# Patient Record
Sex: FEMALE | Race: WHITE | Employment: UNEMPLOYED | URBAN - METROPOLITAN AREA
[De-identification: names, ages, dates, MRNs, and addresses within clinical notes are randomized per-mention and may not be internally consistent; named-entity substitution may affect disease eponyms.]

---

## 2017-01-27 ENCOUNTER — TELEPHONE (OUTPATIENT)
Dept: INTERNAL MEDICINE CLINIC | Age: 57
End: 2017-01-27

## 2017-01-27 RX ORDER — AZITHROMYCIN 250 MG/1
TABLET, FILM COATED ORAL
Qty: 1 PACKET | Refills: 0 | Status: SHIPPED | OUTPATIENT
Start: 2017-01-27 | End: 2017-10-12 | Stop reason: SDUPTHER

## 2017-03-17 ENCOUNTER — TELEPHONE (OUTPATIENT)
Dept: INTERNAL MEDICINE CLINIC | Age: 57
End: 2017-03-17

## 2017-03-17 RX ORDER — AMOXICILLIN 500 MG/1
500 CAPSULE ORAL 3 TIMES DAILY
Qty: 30 CAPSULE | Refills: 0 | Status: SHIPPED | OUTPATIENT
Start: 2017-03-17 | End: 2017-05-19 | Stop reason: SDUPTHER

## 2017-05-19 ENCOUNTER — TELEPHONE (OUTPATIENT)
Dept: INTERNAL MEDICINE CLINIC | Age: 57
End: 2017-05-19

## 2017-05-19 RX ORDER — AMOXICILLIN 500 MG/1
500 CAPSULE ORAL 3 TIMES DAILY
Qty: 30 CAPSULE | Refills: 0 | Status: SHIPPED | OUTPATIENT
Start: 2017-05-19 | End: 2017-05-29

## 2017-06-06 DIAGNOSIS — I10 ESSENTIAL HYPERTENSION: ICD-10-CM

## 2017-06-06 RX ORDER — LOSARTAN POTASSIUM AND HYDROCHLOROTHIAZIDE 12.5; 1 MG/1; MG/1
TABLET ORAL
Qty: 30 TABLET | Refills: 4 | Status: SHIPPED | OUTPATIENT
Start: 2017-06-06 | End: 2017-10-17 | Stop reason: SDUPTHER

## 2017-10-12 ENCOUNTER — TELEPHONE (OUTPATIENT)
Dept: INTERNAL MEDICINE CLINIC | Age: 57
End: 2017-10-12

## 2017-10-12 RX ORDER — AZITHROMYCIN 250 MG/1
TABLET, FILM COATED ORAL
Qty: 1 PACKET | Refills: 0 | Status: SHIPPED | OUTPATIENT
Start: 2017-10-12 | End: 2017-10-17 | Stop reason: ALTCHOICE

## 2017-10-12 NOTE — TELEPHONE ENCOUNTER
Unable to see today.   Called in Marita Rodriguez to [facility] in [place]    can see tomorrow

## 2017-10-17 ENCOUNTER — OFFICE VISIT (OUTPATIENT)
Dept: INTERNAL MEDICINE CLINIC | Age: 57
End: 2017-10-17

## 2017-10-17 VITALS
HEIGHT: 66 IN | WEIGHT: 248.2 LBS | SYSTOLIC BLOOD PRESSURE: 138 MMHG | BODY MASS INDEX: 39.89 KG/M2 | HEART RATE: 76 BPM | DIASTOLIC BLOOD PRESSURE: 84 MMHG

## 2017-10-17 DIAGNOSIS — J02.9 PHARYNGITIS, UNSPECIFIED ETIOLOGY: Primary | ICD-10-CM

## 2017-10-17 DIAGNOSIS — I10 ESSENTIAL HYPERTENSION: ICD-10-CM

## 2017-10-17 PROCEDURE — 99213 OFFICE O/P EST LOW 20 MIN: CPT | Performed by: INTERNAL MEDICINE

## 2017-10-17 RX ORDER — FLUTICASONE PROPIONATE 50 MCG
1 SPRAY, SUSPENSION (ML) NASAL DAILY
Qty: 1 BOTTLE | Refills: 3 | Status: SHIPPED | OUTPATIENT
Start: 2017-10-17 | End: 2020-12-21

## 2017-10-17 RX ORDER — AMOXICILLIN 250 MG/1
250 CAPSULE ORAL 3 TIMES DAILY
Qty: 30 CAPSULE | Refills: 0 | Status: SHIPPED | OUTPATIENT
Start: 2017-10-17 | End: 2017-10-27

## 2017-10-17 RX ORDER — AMOXICILLIN 500 MG/1
500 CAPSULE ORAL 3 TIMES DAILY
Qty: 30 CAPSULE | Refills: 0 | Status: SHIPPED | OUTPATIENT
Start: 2017-10-17 | End: 2017-10-27

## 2017-10-17 RX ORDER — LOSARTAN POTASSIUM AND HYDROCHLOROTHIAZIDE 12.5; 1 MG/1; MG/1
TABLET ORAL
Qty: 30 TABLET | Refills: 5 | Status: SHIPPED | OUTPATIENT
Start: 2017-10-17

## 2017-10-29 ASSESSMENT — ENCOUNTER SYMPTOMS
RESPIRATORY NEGATIVE: 1
ALLERGIC/IMMUNOLOGIC NEGATIVE: 1
GASTROINTESTINAL NEGATIVE: 1
EYES NEGATIVE: 1

## 2017-10-29 NOTE — PROGRESS NOTES
alert and oriented to person, place, and time. Skin: Skin is warm and dry. Psychiatric: She has a normal mood and affect. Assessment:            Plan:      Rancho Bowser [id number] was seen today for other. Diagnoses and all orders for this visit:    Pharyngitis, unspecified etiology  -     amoxicillin (AMOXIL) 250 MG capsule; Take 1 capsule by mouth 3 times daily for 10 days  -     fluticasone (FLONASE) 50 MCG/ACT nasal spray; 1 spray by Nasal route daily  -     amoxicillin (AMOXIL) 500 MG capsule;  Take 1 capsule by mouth 3 times daily for 10 days    Essential hypertension  -     losartan-hydrochlorothiazide (HYZAAR) 100-12.5 MG per tablet; TAKE ONE TABLET BY MOUTH DAILY

## 2017-11-10 ENCOUNTER — OFFICE VISIT (OUTPATIENT)
Dept: INTERNAL MEDICINE CLINIC | Age: 57
End: 2017-11-10

## 2017-11-10 VITALS
HEART RATE: 68 BPM | BODY MASS INDEX: 40.84 KG/M2 | DIASTOLIC BLOOD PRESSURE: 78 MMHG | TEMPERATURE: 97.5 F | SYSTOLIC BLOOD PRESSURE: 146 MMHG | RESPIRATION RATE: 14 BRPM | WEIGHT: 249.2 LBS

## 2017-11-10 DIAGNOSIS — J02.9 PHARYNGITIS, UNSPECIFIED ETIOLOGY: Primary | ICD-10-CM

## 2017-11-10 PROCEDURE — 99213 OFFICE O/P EST LOW 20 MIN: CPT | Performed by: INTERNAL MEDICINE

## 2017-11-10 RX ORDER — AZITHROMYCIN 250 MG/1
TABLET, FILM COATED ORAL
Qty: 1 PACKET | Refills: 1 | Status: SHIPPED | OUTPATIENT
Start: 2017-11-10 | End: 2017-11-20

## 2017-11-19 ASSESSMENT — ENCOUNTER SYMPTOMS
EYES NEGATIVE: 1
ALLERGIC/IMMUNOLOGIC NEGATIVE: 1
GASTROINTESTINAL NEGATIVE: 1
RESPIRATORY NEGATIVE: 1

## 2017-11-20 NOTE — PROGRESS NOTES
Subjective:      Patient ID: Christina Camacho is a 56 y.o. female. HPI      This patient is here for an acute visit secondary to severe pharyngitis. Patient with multiple pits and a tonsil area however the patient has had prior tonsillectomy. Patient has severe sore throat. Review of Systems   Constitutional: Negative. HENT: Negative. Eyes: Negative. Respiratory: Negative. Cardiovascular: Negative. Gastrointestinal: Negative. Endocrine: Negative. Genitourinary: Negative. Musculoskeletal: Negative. Skin: Negative. Allergic/Immunologic: Negative. Neurological: Negative. Hematological: Negative. Psychiatric/Behavioral: Negative. Objective:   Physical Exam   Constitutional: She is oriented to person, place, and time. She appears well-developed and well-nourished. Neck: Normal range of motion. Neck supple. Cardiovascular: Normal rate, regular rhythm, normal heart sounds and intact distal pulses. No murmur heard. Pulmonary/Chest: Effort normal and breath sounds normal.   Abdominal: Soft. Bowel sounds are normal.   Musculoskeletal: Normal range of motion. Neurological: She is alert and oriented to person, place, and time. Skin: Skin is warm and dry. Psychiatric: She has a normal mood and affect. Assessment:            Plan:      Jayda Naqvi was seen today for pharyngitis, chills and ear fullness. Diagnoses and all orders for this visit:    Pharyngitis, unspecified etiology    Other orders  -     azithromycin (ZITHROMAX) 250 MG tablet; Take 2 tabs (500 mg) on Day 1, and take 1 tab (250 mg) on days 2 through 5.

## 2018-10-06 ENCOUNTER — HOSPITAL ENCOUNTER (EMERGENCY)
Age: 58
Discharge: HOME | End: 2018-10-06
Attending: EMERGENCY MEDICINE
Payer: COMMERCIAL

## 2018-10-06 ENCOUNTER — APPOINTMENT (OUTPATIENT)
Dept: GENERAL RADIOLOGY | Age: 58
End: 2018-10-06
Payer: COMMERCIAL

## 2018-10-06 VITALS
BODY MASS INDEX: 41.29 KG/M2 | HEIGHT: 65 IN | OXYGEN SATURATION: 98 % | WEIGHT: 247.8 LBS | DIASTOLIC BLOOD PRESSURE: 78 MMHG | HEART RATE: 75 BPM | SYSTOLIC BLOOD PRESSURE: 149 MMHG | TEMPERATURE: 97.7 F | RESPIRATION RATE: 18 BRPM

## 2018-10-06 DIAGNOSIS — R10.13 ABDOMINAL PAIN, EPIGASTRIC: Primary | ICD-10-CM

## 2018-10-06 DIAGNOSIS — R07.9 CHEST PAIN, UNSPECIFIED TYPE: ICD-10-CM

## 2018-10-06 DIAGNOSIS — R10.13 EPIGASTRIC PAIN: ICD-10-CM

## 2018-10-06 LAB
A/G RATIO: 1.1 (ref 1.1–2.2)
ALBUMIN SERPL-MCNC: 4.2 G/DL (ref 3.4–5)
ALP BLD-CCNC: 162 U/L (ref 40–129)
ALT SERPL-CCNC: 16 U/L (ref 10–40)
AMYLASE: 90 U/L (ref 25–115)
ANION GAP SERPL CALCULATED.3IONS-SCNC: 14 MMOL/L (ref 3–16)
AST SERPL-CCNC: 12 U/L (ref 15–37)
BASOPHILS ABSOLUTE: 0.1 K/UL (ref 0–0.2)
BASOPHILS RELATIVE PERCENT: 0.5 %
BILIRUB SERPL-MCNC: 0.4 MG/DL (ref 0–1)
BILIRUBIN URINE: NEGATIVE
BLOOD, URINE: NEGATIVE
BUN BLDV-MCNC: 17 MG/DL (ref 7–20)
CALCIUM SERPL-MCNC: 9.6 MG/DL (ref 8.3–10.6)
CHLORIDE BLD-SCNC: 102 MMOL/L (ref 99–110)
CLARITY: CLEAR
CO2: 26 MMOL/L (ref 21–32)
COLOR: YELLOW
CREAT SERPL-MCNC: 0.8 MG/DL (ref 0.6–1.1)
EOSINOPHILS ABSOLUTE: 0.1 K/UL (ref 0–0.6)
EOSINOPHILS RELATIVE PERCENT: 0.8 %
GFR AFRICAN AMERICAN: >60
GFR NON-AFRICAN AMERICAN: >60
GLOBULIN: 4 G/DL
GLUCOSE BLD-MCNC: 113 MG/DL (ref 70–99)
GLUCOSE URINE: NEGATIVE MG/DL
HCT VFR BLD CALC: 42.8 % (ref 36–48)
HEMOGLOBIN: 14.3 G/DL (ref 12–16)
KETONES, URINE: NEGATIVE MG/DL
LEUKOCYTE ESTERASE, URINE: NEGATIVE
LIPASE: 36 U/L (ref 13–60)
LYMPHOCYTES ABSOLUTE: 1.7 K/UL (ref 1–5.1)
LYMPHOCYTES RELATIVE PERCENT: 13.7 %
MCH RBC QN AUTO: 28 PG (ref 26–34)
MCHC RBC AUTO-ENTMCNC: 33.4 G/DL (ref 31–36)
MCV RBC AUTO: 83.8 FL (ref 80–100)
MICROSCOPIC EXAMINATION: NORMAL
MONOCYTES ABSOLUTE: 0.5 K/UL (ref 0–1.3)
MONOCYTES RELATIVE PERCENT: 4 %
NEUTROPHILS ABSOLUTE: 10 K/UL (ref 1.7–7.7)
NEUTROPHILS RELATIVE PERCENT: 81 %
NITRITE, URINE: NEGATIVE
PDW BLD-RTO: 12.8 % (ref 12.4–15.4)
PH UA: 6.5
PLATELET # BLD: 287 K/UL (ref 135–450)
PMV BLD AUTO: 9.2 FL (ref 5–10.5)
POTASSIUM REFLEX MAGNESIUM: 3.9 MMOL/L (ref 3.5–5.1)
PROTEIN UA: NEGATIVE MG/DL
RBC # BLD: 5.11 M/UL (ref 4–5.2)
SODIUM BLD-SCNC: 142 MMOL/L (ref 136–145)
SPECIFIC GRAVITY UA: 1.02
TOTAL PROTEIN: 8.2 G/DL (ref 6.4–8.2)
TROPONIN: <0.01 NG/ML
TROPONIN: <0.01 NG/ML
URINE REFLEX TO CULTURE: NORMAL
URINE TYPE: NORMAL
UROBILINOGEN, URINE: 0.2 E.U./DL
WBC # BLD: 12.4 K/UL (ref 4–11)

## 2018-10-06 PROCEDURE — 85025 COMPLETE CBC W/AUTO DIFF WBC: CPT

## 2018-10-06 PROCEDURE — 83690 ASSAY OF LIPASE: CPT

## 2018-10-06 PROCEDURE — 99284 EMERGENCY DEPT VISIT MOD MDM: CPT

## 2018-10-06 PROCEDURE — 6370000000 HC RX 637 (ALT 250 FOR IP): Performed by: EMERGENCY MEDICINE

## 2018-10-06 PROCEDURE — 93005 ELECTROCARDIOGRAM TRACING: CPT | Performed by: EMERGENCY MEDICINE

## 2018-10-06 PROCEDURE — 2500000003 HC RX 250 WO HCPCS: Performed by: EMERGENCY MEDICINE

## 2018-10-06 PROCEDURE — 96374 THER/PROPH/DIAG INJ IV PUSH: CPT

## 2018-10-06 PROCEDURE — 74022 RADEX COMPL AQT ABD SERIES: CPT

## 2018-10-06 PROCEDURE — 96375 TX/PRO/DX INJ NEW DRUG ADDON: CPT

## 2018-10-06 PROCEDURE — 36415 COLL VENOUS BLD VENIPUNCTURE: CPT

## 2018-10-06 PROCEDURE — 96361 HYDRATE IV INFUSION ADD-ON: CPT

## 2018-10-06 PROCEDURE — 82150 ASSAY OF AMYLASE: CPT

## 2018-10-06 PROCEDURE — 6360000002 HC RX W HCPCS: Performed by: EMERGENCY MEDICINE

## 2018-10-06 PROCEDURE — 80053 COMPREHEN METABOLIC PANEL: CPT

## 2018-10-06 PROCEDURE — S0028 INJECTION, FAMOTIDINE, 20 MG: HCPCS | Performed by: EMERGENCY MEDICINE

## 2018-10-06 PROCEDURE — 81003 URINALYSIS AUTO W/O SCOPE: CPT

## 2018-10-06 PROCEDURE — 2580000003 HC RX 258: Performed by: EMERGENCY MEDICINE

## 2018-10-06 PROCEDURE — 84484 ASSAY OF TROPONIN QUANT: CPT

## 2018-10-06 RX ORDER — DICYCLOMINE HCL 20 MG
20 TABLET ORAL EVERY 6 HOURS
Qty: 30 TABLET | Refills: 0 | Status: SHIPPED | OUTPATIENT
Start: 2018-10-06 | End: 2020-12-21

## 2018-10-06 RX ORDER — ONDANSETRON 2 MG/ML
4 INJECTION INTRAMUSCULAR; INTRAVENOUS ONCE
Status: COMPLETED | OUTPATIENT
Start: 2018-10-06 | End: 2018-10-06

## 2018-10-06 RX ORDER — DICYCLOMINE HYDROCHLORIDE 10 MG/ML
20 INJECTION INTRAMUSCULAR ONCE
Status: DISCONTINUED | OUTPATIENT
Start: 2018-10-06 | End: 2018-10-06 | Stop reason: RX

## 2018-10-06 RX ORDER — DICYCLOMINE HYDROCHLORIDE 10 MG/1
20 CAPSULE ORAL ONCE
Status: COMPLETED | OUTPATIENT
Start: 2018-10-06 | End: 2018-10-06

## 2018-10-06 RX ORDER — ONDANSETRON 4 MG/1
4 TABLET, ORALLY DISINTEGRATING ORAL EVERY 8 HOURS PRN
Qty: 10 TABLET | Refills: 0 | Status: SHIPPED | OUTPATIENT
Start: 2018-10-06 | End: 2020-12-21

## 2018-10-06 RX ORDER — SODIUM CHLORIDE 9 MG/ML
INJECTION, SOLUTION INTRAVENOUS CONTINUOUS
Status: DISCONTINUED | OUTPATIENT
Start: 2018-10-06 | End: 2018-10-06 | Stop reason: HOSPADM

## 2018-10-06 RX ORDER — ASPIRIN 81 MG/1
324 TABLET, CHEWABLE ORAL ONCE
Status: COMPLETED | OUTPATIENT
Start: 2018-10-06 | End: 2018-10-06

## 2018-10-06 RX ORDER — FAMOTIDINE 20 MG/1
20 TABLET, FILM COATED ORAL DAILY
Qty: 10 TABLET | Refills: 0 | Status: SHIPPED | OUTPATIENT
Start: 2018-10-06 | End: 2020-12-21

## 2018-10-06 RX ADMIN — ASPIRIN 81 MG 324 MG: 81 TABLET ORAL at 19:06

## 2018-10-06 RX ADMIN — ONDANSETRON 4 MG: 2 INJECTION INTRAMUSCULAR; INTRAVENOUS at 19:08

## 2018-10-06 RX ADMIN — DICYCLOMINE HYDROCHLORIDE 20 MG: 10 CAPSULE ORAL at 20:24

## 2018-10-06 RX ADMIN — SODIUM CHLORIDE: 9 INJECTION, SOLUTION INTRAVENOUS at 19:08

## 2018-10-06 RX ADMIN — FAMOTIDINE 20 MG: 10 INJECTION, SOLUTION INTRAVENOUS at 19:09

## 2018-10-06 ASSESSMENT — ENCOUNTER SYMPTOMS
NAUSEA: 1
DIARRHEA: 0
TROUBLE SWALLOWING: 0
COUGH: 0
VOMITING: 0
WHEEZING: 0
STRIDOR: 0
EYE REDNESS: 0
ABDOMINAL PAIN: 1
SORE THROAT: 0
PHOTOPHOBIA: 0
VOICE CHANGE: 0
SHORTNESS OF BREATH: 0

## 2018-10-06 ASSESSMENT — PAIN SCALES - GENERAL
PAINLEVEL_OUTOF10: 5
PAINLEVEL_OUTOF10: 8
PAINLEVEL_OUTOF10: 5
PAINLEVEL_OUTOF10: 8
PAINLEVEL_OUTOF10: 6

## 2018-10-06 ASSESSMENT — PAIN - FUNCTIONAL ASSESSMENT: PAIN_FUNCTIONAL_ASSESSMENT: 0-10

## 2018-10-06 ASSESSMENT — PAIN DESCRIPTION - LOCATION
LOCATION: ABDOMEN
LOCATION: ABDOMEN
LOCATION: ABDOMEN;CHEST
LOCATION: ABDOMEN
LOCATION: ABDOMEN;CHEST

## 2018-10-06 ASSESSMENT — PAIN DESCRIPTION - ORIENTATION
ORIENTATION: UPPER
ORIENTATION: MID

## 2018-10-06 ASSESSMENT — PAIN DESCRIPTION - PAIN TYPE
TYPE: ACUTE PAIN
TYPE: ACUTE PAIN

## 2018-10-06 ASSESSMENT — PAIN DESCRIPTION - DESCRIPTORS: DESCRIPTORS: ACHING;CRAMPING;DISCOMFORT

## 2018-10-06 ASSESSMENT — HEART SCORE: ECG: 1

## 2018-10-06 ASSESSMENT — PAIN DESCRIPTION - PROGRESSION: CLINICAL_PROGRESSION: GRADUALLY IMPROVING

## 2018-10-06 NOTE — ED TRIAGE NOTES
Pt arrived to the ED via private vehicle from home. Pt c/o upper abd pain that started this morning. Pt states she ate a lot of peanuts last night and that may be the reason. Pt denies vomiting and diarrhea. Pt states she has had a few bowel movements today they were not diarrhea, but they were looser. Denies nausea at this time.  Pain 8/10

## 2018-10-06 NOTE — ED PROVIDER NOTES
nausea. Negative for diarrhea and vomiting. Genitourinary: Negative for flank pain. Musculoskeletal: Negative for neck pain and neck stiffness. Skin: Negative for rash. Neurological: Negative for facial asymmetry, speech difficulty and light-headedness. Hematological: Negative for adenopathy. Psychiatric/Behavioral: Negative for confusion. The patient is not nervous/anxious. Positives and Pertinent negatives as per HPI. Except as noted above in the ROS, all other systems were reviewed and negative.        PAST MEDICAL HISTORY     Past Medical History:   Diagnosis Date    Acute rheumatic arthritis     Chronic kidney disease     freq UTIs    Headache(784.0)     migraines & dizziness    Hypertension     Lung cancer ([facility] [address]     s/p removal 2/3 of lung    Murmur, cardiac     Pneumonia     Rheumatic fever     1980    Seizures ([facility] [address]     just as a baby         SURGICAL HISTORY       Past Surgical History:   Procedure Laterality Date    HYSTERECTOMY      patient still has ovaries    LUNG SURGERY Right 2007    Partial upper lobectomy         CURRENT MEDICATIONS       Previous Medications    FLUTICASONE (FLONASE) 50 MCG/ACT NASAL SPRAY    1 spray by Nasal route daily    LOSARTAN-HYDROCHLOROTHIAZIDE (HYZAAR) 100-12.5 MG PER TABLET    TAKE ONE TABLET BY MOUTH DAILY         ALLERGIES     Ciprofloxacin; Cefuroxime axetil; Codeine; Erythromycin; and Prochlorperazine edisylate    FAMILY HISTORY       Family History   Problem Relation Age of Onset    Heart Disease Mother     Diabetes Mother     Cancer Father         lung    Stroke Father     COPD Father     Diabetes Brother           SOCIAL HISTORY       Social History     Social History    Marital status:      Spouse name: N/A    Number of children: N/A    Years of education: N/A     Social History Main Topics    Smoking status: Never Smoker    Smokeless tobacco: Never Used    Alcohol use No    Drug use: No    Sexual activity: Not

## 2018-10-07 LAB
EKG ATRIAL RATE: 75 BPM
EKG ATRIAL RATE: 87 BPM
EKG DIAGNOSIS: NORMAL
EKG DIAGNOSIS: NORMAL
EKG P AXIS: 54 DEGREES
EKG P AXIS: 56 DEGREES
EKG P-R INTERVAL: 128 MS
EKG P-R INTERVAL: 146 MS
EKG Q-T INTERVAL: 366 MS
EKG Q-T INTERVAL: 392 MS
EKG QRS DURATION: 76 MS
EKG QRS DURATION: 76 MS
EKG QTC CALCULATION (BAZETT): 437 MS
EKG QTC CALCULATION (BAZETT): 440 MS
EKG R AXIS: 48 DEGREES
EKG R AXIS: 56 DEGREES
EKG T AXIS: 29 DEGREES
EKG T AXIS: 49 DEGREES
EKG VENTRICULAR RATE: 75 BPM
EKG VENTRICULAR RATE: 87 BPM

## 2018-10-07 PROCEDURE — 93010 ELECTROCARDIOGRAM REPORT: CPT | Performed by: INTERNAL MEDICINE

## 2018-10-07 NOTE — ED PROVIDER NOTES
I received this patient in signout from  [facility]. We discussed the patient's initial history, physical, workup thus far, and medical decision making to this point. Briefly, Drew Burk is a 66 y.o. female  who presents to the ED complaining of epigastric abdominal pain/chest pain. Judythe Layer     Pending studies at time of signout include:   Results for orders placed or performed during the hospital encounter of 10/06/18   Urine Reflex to Culture   Result Value Ref Range    Color, UA Yellow Straw/Yellow    Clarity, UA Clear Clear    Glucose, Ur Negative Negative mg/dL    Bilirubin Urine Negative Negative    Ketones, Urine Negative Negative mg/dL    Specific Gravity, UA 1.025 1.005 - 1.030    Blood, Urine Negative Negative    pH, UA 6.5 5.0 - 8.0    Protein, UA Negative Negative mg/dL    Urobilinogen, Urine 0.2 <2.0 E.U./dL    Nitrite, Urine Negative Negative    Leukocyte Esterase, Urine Negative Negative    Microscopic Examination Not Indicated     Urine Reflex to Culture Not Indicated     Urine Type Voided    CBC auto differential   Result Value Ref Range    WBC 12.4 (H) 4.0 - 11.0 K/uL    RBC 5.11 4.00 - 5.20 M/uL    Hemoglobin 14.3 12.0 - 16.0 g/dL    Hematocrit 42.8 36.0 - 48.0 %    MCV 83.8 80.0 - 100.0 fL    MCH 28.0 26.0 - 34.0 pg    MCHC 33.4 31.0 - 36.0 g/dL    RDW 12.8 12.4 - 15.4 %    Platelets 247 894 - 627 K/uL    MPV 9.2 5.0 - 10.5 fL    Neutrophils % 81.0 %    Lymphocytes % 13.7 %    Monocytes % 4.0 %    Eosinophils % 0.8 %    Basophils % 0.5 %    Neutrophils # 10.0 (H) 1.7 - 7.7 K/uL    Lymphocytes # 1.7 1.0 - 5.1 K/uL    Monocytes # 0.5 0.0 - 1.3 K/uL    Eosinophils # 0.1 0.0 - 0.6 K/uL    Basophils # 0.1 0.0 - 0.2 K/uL   Troponin   Result Value Ref Range    Troponin <0.01 <0.01 ng/mL   Comprehensive Metabolic Panel w/ Reflex to MG   Result Value Ref Range    Sodium 142 136 - 145 mmol/L    Potassium reflex Magnesium 3.9 3.5 - 5.1 mmol/L    Chloride 102 99 - 110 mmol/L    CO2 26 21 - 32 mmol/L    Anion Gap 14 3 - 16    Glucose 113 (H) 70 - 99 mg/dL    BUN 17 7 - 20 mg/dL    CREATININE 0.8 0.6 - 1.1 mg/dL    GFR Non-African American >60 >60    GFR African American >60 >60    Calcium 9.6 8.3 - 10.6 mg/dL    Total Protein 8.2 6.4 - 8.2 g/dL    Alb 4.2 3.4 - 5.0 g/dL    Albumin/Globulin Ratio 1.1 1.1 - 2.2    Total Bilirubin 0.4 0.0 - 1.0 mg/dL    Alkaline Phosphatase 162 (H) 40 - 129 U/L    ALT 16 10 - 40 U/L    AST 12 (L) 15 - 37 U/L    Globulin 4.0 g/dL   Amylase   Result Value Ref Range    Amylase 90 25 - 115 U/L   Lipase   Result Value Ref Range    Lipase 36.0 13.0 - 60.0 U/L   Troponin   Result Value Ref Range    Troponin <0.01 <0.01 ng/mL       The patient will be reassessed after workup above is completed. Anticipated disposition at time of signout is discharge      ED COURSE/MDM  I introduced myself to the patient and performed my initial assessment on [address] [place] [address]. There is no significant change in the patient's history from what is documented initially by Dr. Whitney Davey  after my evaluation. Old records reviewed. Labs and imaging reviewed and results discussed with patient. Since time of sign out, the patient's ED workup was notable for above results    During the patient's ED course, the patient was given:  Medications   aspirin chewable tablet 324 mg (324 mg Oral Given 10/6/18 1906)   famotidine (PEPCID) injection 20 mg (20 mg Intravenous Given 10/6/18 1909)   ondansetron (ZOFRAN) injection 4 mg (4 mg Intravenous Given 10/6/18 1908)   dicyclomine (BENTYL) capsule 20 mg (20 mg Oral Given 10/6/18 2024)        CLINICAL IMPRESSION  1. Abdominal pain, epigastric    2. Chest pain, unspecified type    3. Epigastric pain        Blood pressure (!) 149/78, pulse 75, temperature 97.7 °F (36.5 °C), temperature source Oral, resp. rate 18, height 5' 5\" (1.651 m), weight 247 lb 12.8 oz (112.4 kg), SpO2 98 %, not currently breastfeeding.     Brisa Leo was discharged to home in good

## 2018-10-18 NOTE — ED PROVIDER NOTES
abdominal pain and nausea. Negative for diarrhea and vomiting. Genitourinary: Negative for flank pain. Musculoskeletal: Negative for neck pain and neck stiffness. Skin: Negative for rash. Neurological: Negative for facial asymmetry, speech difficulty and light-headedness. Hematological: Negative for adenopathy. Psychiatric/Behavioral: Negative for confusion. The patient is not nervous/anxious.          Positives and Pertinent negatives as per HPI.   Except as noted above in the ROS, all other systems were reviewed and negative.         PAST MEDICAL HISTORY      Past Medical History        Past Medical History:   Diagnosis Date    Acute rheumatic arthritis      Chronic kidney disease       freq UTIs    Headache(784.0)       migraines & dizziness    Hypertension      Lung cancer ([facility] [address]       s/p removal 2/3 of lung    Murmur, cardiac      Pneumonia      Rheumatic fever       1980    Seizures (H[facility])       just as a baby               SURGICAL HISTORY        Past Surgical History         Past Surgical History:   Procedure Laterality Date    HYSTERECTOMY         patient still has ovaries    LUNG SURGERY Right 2007     Partial upper lobectomy               CURRENT MEDICATIONS             Previous Medications     FLUTICASONE (FLONASE) 50 MCG/ACT NASAL SPRAY    1 spray by Nasal route daily     LOSARTAN-HYDROCHLOROTHIAZIDE (HYZAAR) 100-12.5 MG PER TABLET    TAKE ONE TABLET BY MOUTH DAILY            ALLERGIES     Ciprofloxacin; Cefuroxime axetil; Codeine; Erythromycin; and Prochlorperazine edisylate     FAMILY HISTORY        Family History         Family History   Problem Relation Age of Onset    Heart Disease Mother      Diabetes Mother      Cancer Father           lung    Stroke Father      COPD Father      Diabetes Brother                 SOCIAL HISTORY        Social History   Social History            Social History    Marital status:        Spouse name: N/A    Number of children: N/A

## 2019-02-27 ENCOUNTER — HOSPITAL ENCOUNTER (OUTPATIENT)
Age: 59
Discharge: HOME | End: 2019-02-27
Payer: COMMERCIAL

## 2019-02-27 ENCOUNTER — HOSPITAL ENCOUNTER (OUTPATIENT)
Dept: GENERAL RADIOLOGY | Age: 59
Discharge: HOME | End: 2019-02-27
Payer: COMMERCIAL

## 2019-02-27 DIAGNOSIS — M79.671 RIGHT FOOT PAIN: ICD-10-CM

## 2019-02-27 PROCEDURE — 73630 X-RAY EXAM OF FOOT: CPT

## 2019-03-05 ENCOUNTER — HOSPITAL ENCOUNTER (OUTPATIENT)
Age: 59
End: 2019-03-05
Payer: COMMERCIAL

## 2019-03-05 ENCOUNTER — HOSPITAL ENCOUNTER (OUTPATIENT)
Dept: GENERAL RADIOLOGY | Age: 59
Discharge: HOME | End: 2019-03-05
Payer: COMMERCIAL

## 2019-03-05 DIAGNOSIS — S99.201A: ICD-10-CM

## 2019-03-05 PROCEDURE — 73630 X-RAY EXAM OF FOOT: CPT

## 2019-03-05 PROCEDURE — 73660 X-RAY EXAM OF TOE(S): CPT

## 2019-03-08 ENCOUNTER — OFFICE VISIT (OUTPATIENT)
Dept: ORTHOPEDIC SURGERY | Age: 59
End: 2019-03-08
Payer: COMMERCIAL

## 2019-03-08 VITALS
RESPIRATION RATE: 16 BRPM | HEIGHT: 65 IN | WEIGHT: 247 LBS | BODY MASS INDEX: 41.15 KG/M2 | HEART RATE: 68 BPM | SYSTOLIC BLOOD PRESSURE: 145 MMHG | DIASTOLIC BLOOD PRESSURE: 68 MMHG

## 2019-03-08 DIAGNOSIS — S92.911A CLOSED FRACTURE OF PROXIMAL PHALANX OF TOE OF RIGHT FOOT: Primary | ICD-10-CM

## 2019-03-08 PROCEDURE — L3260 AMBULATORY SURGICAL BOOT EAC: HCPCS | Performed by: ORTHOPAEDIC SURGERY

## 2019-03-08 PROCEDURE — 99203 OFFICE O/P NEW LOW 30 MIN: CPT | Performed by: ORTHOPAEDIC SURGERY

## 2019-03-08 PROCEDURE — 28510 TREATMENT OF TOE FRACTURE: CPT | Performed by: ORTHOPAEDIC SURGERY

## 2019-04-05 ENCOUNTER — OFFICE VISIT (OUTPATIENT)
Dept: ORTHOPEDIC SURGERY | Age: 59
End: 2019-04-05

## 2019-04-05 VITALS — HEIGHT: 65 IN | HEART RATE: 60 BPM | WEIGHT: 245 LBS | RESPIRATION RATE: 16 BRPM | BODY MASS INDEX: 40.82 KG/M2

## 2019-04-05 DIAGNOSIS — S92.911A CLOSED FRACTURE OF PROXIMAL PHALANX OF TOE OF RIGHT FOOT: Primary | ICD-10-CM

## 2019-04-05 PROCEDURE — 99024 POSTOP FOLLOW-UP VISIT: CPT | Performed by: ORTHOPAEDIC SURGERY

## 2019-04-05 PROCEDURE — APPNB15 APP NON BILLABLE TIME 0-15 MINS: Performed by: NURSE PRACTITIONER

## 2019-04-05 NOTE — PROGRESS NOTES
use: No    Drug use: No    Sexual activity: Not on file     Comment:    Lifestyle    Physical activity:     Days per week: Not on file     Minutes per session: Not on file    Stress: Not on file   Relationships    Social connections:     Talks on phone: Not on file     Gets together: Not on file     Attends Taoism service: Not on file     Active member of club or organization: Not on file     Attends meetings of clubs or organizations: Not on file     Relationship status: Not on file    Intimate partner violence:     Fear of current or ex partner: Not on file     Emotionally abused: Not on file     Physically abused: Not on file     Forced sexual activity: Not on file   Other Topics Concern    Not on file   Social History Narrative    Not on file       Family History   Problem Relation Age of Onset    Heart Disease Mother     Diabetes Mother     Cancer Father         lung    Stroke Father     COPD Father     Diabetes Brother        Current Outpatient Medications on File Prior to Visit   Medication Sig Dispense Refill    dicyclomine (BENTYL) 20 MG tablet Take 1 tablet by mouth every 6 hours for 30 doses 30 tablet 0    ondansetron (ZOFRAN ODT) 4 MG disintegrating tablet Take 1 tablet by mouth every 8 hours as needed for Nausea or Vomiting 10 tablet 0    famotidine (PEPCID) 20 MG tablet Take 1 tablet by mouth daily for 10 doses 10 tablet 0    fluticasone (FLONASE) 50 MCG/ACT nasal spray 1 spray by Nasal route daily 1 Bottle 3    losartan-hydrochlorothiazide (HYZAAR) 100-12.5 MG per tablet TAKE ONE TABLET BY MOUTH DAILY 30 tablet 5     No current facility-administered medications on file prior to visit. Pertinent items are noted in HPI  Review of systems reviewed from Patient History Form dated on 3/8/2019 and available in the patient's chart under the Media tab. No change noted. PHYSICAL EXAMINATION:  Ms. Brandon Banerjee is a very pleasant 64 y.o.   female who presents today in no acute distress, awake, alert, and oriented. She is well dressed, nourished and  groomed. Patient with normal affect. Height is  5' 5\" (1.651 m), weight is 245 lb (111.1 kg), Body mass index is 40.77 kg/m². Resting respiratory rate is 16. Examination of the gait, showed that the patient walks with a limp in post-op shoe, WB right  leg . Examination of both feet and ankles showing a decreased range of motion of the right foot fourth toe compare to the other side because of pain. There is mild swelling that can be seen, no ecchymosis over fourth toe of the right foot. She  has intact sensation and good pedal pulses. She has mild tenderness on deep palpation over the fourth toe proximal phalanx right foot        IMAGING: Xray's were reviewed. taken today in the office, 3 views of the right foot, and showed a fourth toe proximal phalanx minimally displaced fracture. IMPRESSION: Right foot fourth toe proximal phalanx minimally displaced fracture. PLAN: I discussed that the overall alignment of this fracture is good. She can be weightbearing as tolerated and discontinue the postop shoe. We will see her  back in 2 months or as needed at which time we will get a new xray of the right foot.         BARBARA Bernal - CNP

## 2019-09-04 ENCOUNTER — HOSPITAL ENCOUNTER (OUTPATIENT)
Age: 59
Discharge: HOME | End: 2019-09-04
Payer: COMMERCIAL

## 2019-09-04 ENCOUNTER — HOSPITAL ENCOUNTER (OUTPATIENT)
Dept: GENERAL RADIOLOGY | Age: 59
Discharge: HOME | End: 2019-09-04
Payer: COMMERCIAL

## 2019-09-04 DIAGNOSIS — M19.90 ARTHRITIS: ICD-10-CM

## 2019-09-04 PROCEDURE — 73560 X-RAY EXAM OF KNEE 1 OR 2: CPT

## 2020-03-17 ENCOUNTER — HOSPITAL ENCOUNTER (EMERGENCY)
Age: 60
Discharge: HOME | End: 2020-03-17
Payer: COMMERCIAL

## 2020-03-17 VITALS
OXYGEN SATURATION: 96 % | TEMPERATURE: 97.6 F | HEIGHT: 65 IN | BODY MASS INDEX: 39.08 KG/M2 | RESPIRATION RATE: 17 BRPM | SYSTOLIC BLOOD PRESSURE: 160 MMHG | DIASTOLIC BLOOD PRESSURE: 72 MMHG | WEIGHT: 234.57 LBS | HEART RATE: 95 BPM

## 2020-03-17 LAB
RAPID INFLUENZA  B AGN: NEGATIVE
RAPID INFLUENZA A AGN: NEGATIVE

## 2020-03-17 PROCEDURE — 99283 EMERGENCY DEPT VISIT LOW MDM: CPT

## 2020-03-17 PROCEDURE — 87804 INFLUENZA ASSAY W/OPTIC: CPT

## 2020-03-17 ASSESSMENT — ENCOUNTER SYMPTOMS
SHORTNESS OF BREATH: 1
COUGH: 1
GASTROINTESTINAL NEGATIVE: 1
SORE THROAT: 1

## 2020-03-17 NOTE — ED NOTES
Discharge instructions reviewed with patient, no further questions.      Yury Grider RN  03/17/20 [id number]

## 2020-03-17 NOTE — ED PROVIDER NOTES
 Chronic kidney disease     freq UTIs    Headache(784.0)     migraines & dizziness    Hypertension     Lung cancer ([facility] [address]     s/p removal 2/3 of lung    Murmur, cardiac     Pneumonia     Rheumatic fever     1980    Seizures ([facility] [address]     just as a baby       SURGICAL HISTORY           Procedure Laterality Date    HYSTERECTOMY      patient still has ovaries    LUNG SURGERY Right 2007    Partial upper lobectomy       CURRENT MEDICATIONS       Discharge Medication List as of 3/17/2020  6:45 PM      CONTINUE these medications which have NOT CHANGED    Details   dicyclomine (BENTYL) 20 MG tablet Take 1 tablet by mouth every 6 hours for 30 doses, Disp-30 tablet, R-0Print      ondansetron (ZOFRAN ODT) 4 MG disintegrating tablet Take 1 tablet by mouth every 8 hours as needed for Nausea or Vomiting, Disp-10 tablet, R-0Print      famotidine (PEPCID) 20 MG tablet Take 1 tablet by mouth daily for 10 doses, Disp-10 tablet, R-0Print      fluticasone (FLONASE) 50 MCG/ACT nasal spray 1 spray by Nasal route daily, Disp-1 Bottle, R-3Normal      losartan-hydrochlorothiazide (HYZAAR) 100-12.5 MG per tablet TAKE ONE TABLET BY MOUTH DAILY, Disp-30 tablet, R-5Normal             ALLERGIES     Ciprofloxacin; Cefuroxime axetil; Codeine; Erythromycin; and Prochlorperazine edisylate    FAMILY HISTORY           Problem Relation Age of Onset    Heart Disease Mother     Diabetes Mother     Cancer Father         lung    Stroke Father     COPD Father     Diabetes Brother      Family Status   Relation Name Status    Mother  (Not Specified)    Father  (Not Specified)    Brother  (Not Specified)        SOCIAL HISTORY      reports that she has never smoked. She has never used smokeless tobacco. She reports that she does not drink alcohol or use drugs.     PHYSICAL EXAM    (up to 7 for level 4, 8 or more for level 5)      ED Triage Vitals [03/17/20 1640]   BP Temp Temp Source Pulse Resp SpO2 Height Weight   (!) 160/72 97.6 °F (36.4 °C) Oral 95 17 96 % 5' 5\" (1.651 m) 234 lb 9.1 oz (106.4 kg)       Physical Exam  Vitals signs and nursing note reviewed. Constitutional:       General: She is not in acute distress. Appearance: Normal appearance. She is not ill-appearing or toxic-appearing. HENT:      Head: Normocephalic. Nose: Nose normal.      Mouth/Throat:      Mouth: Mucous membranes are moist.      Pharynx: Oropharynx is clear. Comments: Posterior pharynx is mildly erythematous without evidence of swelling. Eyes:      Extraocular Movements: Extraocular movements intact. Pupils: Pupils are equal, round, and reactive to light. Neck:      Musculoskeletal: Normal range of motion and neck supple. Cardiovascular:      Rate and Rhythm: Normal rate and regular rhythm. Pulses: Normal pulses. Heart sounds: Normal heart sounds. Pulmonary:      Effort: Pulmonary effort is normal.      Breath sounds: Normal breath sounds. No wheezing, rhonchi or rales. Lymphadenopathy:      Cervical: No cervical adenopathy. Skin:     General: Skin is warm and dry. Capillary Refill: Capillary refill takes less than 2 seconds. Neurological:      General: No focal deficit present. Mental Status: She is alert and oriented to person, place, and time.            DIAGNOSTIC RESULTS     RADIOLOGY:   Non-plain film images such as CT, Ultrasound and MRI are read by the radiologist. Reyes Gallon radiographic images are visualized and preliminarily interpreted by BARBARA Parry CNP with the below findings:      Interpretation per the Radiologist below, if available at the time of this note:    No orders to display       LABS:  Labs Reviewed   RAPID INFLUENZA A/B ANTIGENS    Narrative:     Performed at:  [facility] [address] [place], [place] [address]   Phone [phone]   RESPIRATORY PANEL, MOLECULAR       All other labs were within normal range or not returned as of this dictation. EMERGENCY DEPARTMENT COURSE and DIFFERENTIAL DIAGNOSIS/MDM:   Vitals:    Vitals:    03/17/20 1640   BP: (!) 160/72   Pulse: 95   Resp: 17   Temp: 97.6 °F (36.4 °C)   TempSrc: Oral   SpO2: 96%   Weight: 234 lb 9.1 oz (106.4 kg)   Height: 5' 5\" (1.651 m)     Medications - No data to display    MDM  Differential diagnoses: Viral upper respiratory, influenza, human metapneumovirus, bronchitis, viral laryngitis    Screening per [facility]/ CDC/hospital protocol for CoVid 19 indicates that the patient is a low risk/ low probability, presenting not acutely ill without evidence of distress and afebrile therefore CoVid 19 testing is not indicated. Patient was seen and evaluated per myself. Dr. Jose Mercado is present available for consultation as needed. Dr. Jose Mercado is actually managing the care of this patient's . I will consult with him directly to make sure that I have not exercised full review of the protocol and risk factors for this patient. Influenza's testing was obtained here in the emergency department and is negative. The molecular respiratory panel was obtained and due to the high volume of respiratory panel screenings there is a backlog of resulting and this test result may not be available for another 24 hours. I spent 20 to 25 minutes at bedside explaining to patient the protocols and indications for screening and testing. I discussed multiple scenarios of what would be considered high risk high profile. I have expressed and explained to her that those being actually tested for coded 19 are typically very ill requiring admission or ICU hospitalization and under the current protocol guidelines those of the patients that are actually being tested. I have explained to her that that there is a low number of test kits available. Patient was dissatisfied with the discussion and felt that she was being dismissed.   I have explained to her that she can consult with Dr. Ivy Nicolas and there may be

## 2020-12-21 ENCOUNTER — HOSPITAL ENCOUNTER (EMERGENCY)
Age: 60
Discharge: HOME | End: 2020-12-21
Attending: EMERGENCY MEDICINE
Payer: COMMERCIAL

## 2020-12-21 ENCOUNTER — APPOINTMENT (OUTPATIENT)
Dept: GENERAL RADIOLOGY | Age: 60
End: 2020-12-21
Payer: COMMERCIAL

## 2020-12-21 VITALS
WEIGHT: 229.5 LBS | SYSTOLIC BLOOD PRESSURE: 129 MMHG | HEART RATE: 86 BPM | RESPIRATION RATE: 16 BRPM | TEMPERATURE: 98.1 F | BODY MASS INDEX: 36.88 KG/M2 | DIASTOLIC BLOOD PRESSURE: 60 MMHG | HEIGHT: 66 IN | OXYGEN SATURATION: 98 %

## 2020-12-21 LAB
A/G RATIO: 1.3 (ref 1.1–2.2)
ALBUMIN SERPL-MCNC: 4.3 G/DL (ref 3.4–5)
ALP BLD-CCNC: 132 U/L (ref 40–129)
ALT SERPL-CCNC: 12 U/L (ref 10–40)
ANION GAP SERPL CALCULATED.3IONS-SCNC: 13 MMOL/L (ref 3–16)
AST SERPL-CCNC: 14 U/L (ref 15–37)
BASOPHILS ABSOLUTE: 0 K/UL (ref 0–0.2)
BASOPHILS RELATIVE PERCENT: 1 %
BILIRUB SERPL-MCNC: 0.5 MG/DL (ref 0–1)
BILIRUBIN URINE: NEGATIVE
BLOOD, URINE: NEGATIVE
BUN BLDV-MCNC: 10 MG/DL (ref 7–20)
CALCIUM SERPL-MCNC: 9.4 MG/DL (ref 8.3–10.6)
CHLORIDE BLD-SCNC: 100 MMOL/L (ref 99–110)
CLARITY: NORMAL
CO2: 26 MMOL/L (ref 21–32)
COLOR: YELLOW
CREAT SERPL-MCNC: 0.8 MG/DL (ref 0.6–1.1)
EOSINOPHILS ABSOLUTE: 0 K/UL (ref 0–0.6)
EOSINOPHILS RELATIVE PERCENT: 0.4 %
GFR AFRICAN AMERICAN: >60
GFR NON-AFRICAN AMERICAN: >60
GLOBULIN: 3.4 G/DL
GLUCOSE BLD-MCNC: 101 MG/DL (ref 70–99)
GLUCOSE URINE: NEGATIVE MG/DL
HCT VFR BLD CALC: 45.3 % (ref 36–48)
HEMOGLOBIN: 14.3 G/DL (ref 12–16)
KETONES, URINE: NEGATIVE MG/DL
LEUKOCYTE ESTERASE, URINE: NEGATIVE
LYMPHOCYTES ABSOLUTE: 1 K/UL (ref 1–5.1)
LYMPHOCYTES RELATIVE PERCENT: 26.4 %
MAGNESIUM: 2.1 MG/DL (ref 1.8–2.4)
MCH RBC QN AUTO: 26.2 PG (ref 26–34)
MCHC RBC AUTO-ENTMCNC: 31.5 G/DL (ref 31–36)
MCV RBC AUTO: 83.1 FL (ref 80–100)
MICROSCOPIC EXAMINATION: NORMAL
MONOCYTES ABSOLUTE: 0.4 K/UL (ref 0–1.3)
MONOCYTES RELATIVE PERCENT: 10 %
NEUTROPHILS ABSOLUTE: 2.5 K/UL (ref 1.7–7.7)
NEUTROPHILS RELATIVE PERCENT: 62.2 %
NITRITE, URINE: NEGATIVE
PDW BLD-RTO: 12.8 % (ref 12.4–15.4)
PH UA: 5.5 (ref 5–8)
PLATELET # BLD: 222 K/UL (ref 135–450)
PMV BLD AUTO: 9.6 FL (ref 5–10.5)
POTASSIUM REFLEX MAGNESIUM: 3.5 MMOL/L (ref 3.5–5.1)
PROTEIN UA: NEGATIVE MG/DL
RAPID INFLUENZA  B AGN: NEGATIVE
RAPID INFLUENZA A AGN: NEGATIVE
RBC # BLD: 5.45 M/UL (ref 4–5.2)
S PYO AG THROAT QL: NEGATIVE
SODIUM BLD-SCNC: 139 MMOL/L (ref 136–145)
SPECIFIC GRAVITY UA: 1.02 (ref 1–1.03)
TOTAL PROTEIN: 7.7 G/DL (ref 6.4–8.2)
URINE REFLEX TO CULTURE: NORMAL
URINE TYPE: NORMAL
UROBILINOGEN, URINE: 0.2 E.U./DL
WBC # BLD: 3.9 K/UL (ref 4–11)

## 2020-12-21 PROCEDURE — 2580000003 HC RX 258: Performed by: EMERGENCY MEDICINE

## 2020-12-21 PROCEDURE — 99283 EMERGENCY DEPT VISIT LOW MDM: CPT

## 2020-12-21 PROCEDURE — 81003 URINALYSIS AUTO W/O SCOPE: CPT

## 2020-12-21 PROCEDURE — 36415 COLL VENOUS BLD VENIPUNCTURE: CPT

## 2020-12-21 PROCEDURE — 96374 THER/PROPH/DIAG INJ IV PUSH: CPT

## 2020-12-21 PROCEDURE — 87880 STREP A ASSAY W/OPTIC: CPT

## 2020-12-21 PROCEDURE — 87081 CULTURE SCREEN ONLY: CPT

## 2020-12-21 PROCEDURE — 85025 COMPLETE CBC W/AUTO DIFF WBC: CPT

## 2020-12-21 PROCEDURE — 80053 COMPREHEN METABOLIC PANEL: CPT

## 2020-12-21 PROCEDURE — 83735 ASSAY OF MAGNESIUM: CPT

## 2020-12-21 PROCEDURE — 71045 X-RAY EXAM CHEST 1 VIEW: CPT

## 2020-12-21 PROCEDURE — 6360000002 HC RX W HCPCS: Performed by: EMERGENCY MEDICINE

## 2020-12-21 PROCEDURE — 87804 INFLUENZA ASSAY W/OPTIC: CPT

## 2020-12-21 PROCEDURE — U0003 INFECTIOUS AGENT DETECTION BY NUCLEIC ACID (DNA OR RNA); SEVERE ACUTE RESPIRATORY SYNDROME CORONAVIRUS 2 (SARS-COV-2) (CORONAVIRUS DISEASE [COVID-19]), AMPLIFIED PROBE TECHNIQUE, MAKING USE OF HIGH THROUGHPUT TECHNOLOGIES AS DESCRIBED BY CMS-2020-01-R: HCPCS

## 2020-12-21 RX ORDER — CLINDAMYCIN HYDROCHLORIDE 300 MG/1
150 CAPSULE ORAL 3 TIMES DAILY
COMMUNITY
End: 2021-02-21

## 2020-12-21 RX ORDER — ONDANSETRON 4 MG/1
4 TABLET, ORALLY DISINTEGRATING ORAL EVERY 8 HOURS PRN
Qty: 20 TABLET | Refills: 0 | Status: SHIPPED | OUTPATIENT
Start: 2020-12-21 | End: 2021-02-21

## 2020-12-21 RX ORDER — ASPIRIN 81 MG/1
81 TABLET, CHEWABLE ORAL DAILY
COMMUNITY
End: 2021-06-21

## 2020-12-21 RX ORDER — ONDANSETRON 2 MG/ML
4 INJECTION INTRAMUSCULAR; INTRAVENOUS ONCE
Status: COMPLETED | OUTPATIENT
Start: 2020-12-21 | End: 2020-12-21

## 2020-12-21 RX ORDER — 0.9 % SODIUM CHLORIDE 0.9 %
1000 INTRAVENOUS SOLUTION INTRAVENOUS ONCE
Status: COMPLETED | OUTPATIENT
Start: 2020-12-21 | End: 2020-12-21

## 2020-12-21 RX ADMIN — ONDANSETRON 4 MG: 2 INJECTION INTRAMUSCULAR; INTRAVENOUS at 17:35

## 2020-12-21 RX ADMIN — SODIUM CHLORIDE 1000 ML: 9 INJECTION, SOLUTION INTRAVENOUS at 17:34

## 2020-12-21 ASSESSMENT — PAIN SCALES - GENERAL: PAINLEVEL_OUTOF10: 0

## 2020-12-21 NOTE — ED PROVIDER NOTES
[address]      Pt Name: Merryl Baumgarten  MRN: [id number]  [place] 1960  Date of evaluation: 12/21/2020  Provider: Josesito Perales, [address] [place] [address]       Chief Complaint   Patient presents with    Concern For COVID-19     pt. has been sick on & off for 8 months, has been on antibiotics several times since March, on Clindamycin now since Wednesday         HISTORY OF PRESENT ILLNESS   (Location/Symptom, Timing/Onset, Context/Setting, Quality, Duration, Modifying Factors, Severity)  Note limiting factors. Merryl Baumgarten is a 64 y.o. female who presents to the emergency department with a complaint of possible Covid. She states that she initially became ill 1 week ago with sore throat, dry nonproductive cough, and body aches. On Wednesday of last week, 5 days ago, she lost taste and smell. Her daughter is 29years old and has identical symptoms that began at the same time. The patient denies any chest pain, heaviness pressure or tightness. She denies any headache neck pain or stiffness. She denies any shortness of breath or dyspnea on exertion. She denies any abdominal pain but does report some loss of appetite, nausea, and decreased oral intake. She does report that she is urinating regularly. She denies any back pain or flank pain. She denies any dysuria hematuria frequency urgency. She states that she has a tendency to get dehydrated and is concerned that she is dehydrated. She denies any history of asthma, COPD, however, she did have lung cancer with a partial lobectomy several years ago. She did not require chemotherapy or radiation. She is not receiving any current active treatment and was considered cured according to the patient. She denies any travel history. She denies any specific exposure to COVID-19. She does report that she has been sick on and off for the last 8 months with occasional sinus infections.   She started taking some medication from an old prescription 3 days ago, clindamycin. She does report that she had a fever last night of 100.7. Nursing Notes were reviewed. HPI        REVIEW OF SYSTEMS    (2-9 systems for level 4, 10 or more for level 5)         Eyes: Negative for redness or drainage. Respiratory: Negative for shortness of breath or dyspnea on exertion. Cardiovascular: Negative for chest pain. Gastrointestinal: Negative for abdominal pain. Negative for vomiting or diarrhea. Genitourinary: Negative for flank pain. Negative for dysuria. Negative for hematuria. Neurological: Negative for headache. Musculoskeletal:  Negative edema. Hematological: Negative for adenopathy. All systems are reviewed and are negative except for those listed above in the history of present illness and ROS.         PAST MEDICAL HISTORY     Past Medical History:   Diagnosis Date    Acute rheumatic arthritis     Chronic kidney disease     freq UTIs    Headache(784.0)     migraines & dizziness    Hypertension     Lung cancer ([address]     s/p removal 2/3 of lung    Murmur, cardiac     Pneumonia     Rheumatic fever     1980    Seiz[address] ([facility] [address]     just as a baby         SURGICAL HISTORY       Past Surgical History:   Procedure Laterality Date    HYSTERECTOMY      patient still has ovaries    LUNG SURGERY Right 2007    Partial upper lobectomy         CURRENT MEDICATIONS       Previous Medications    ASPIRIN 81 MG CHEWABLE TABLET    Take 81 mg by mouth daily    CLINDAMYCIN (CLEOCIN) 300 MG CAPSULE    Take 150 mg by mouth 3 times daily     LOSARTAN-HYDROCHLOROTHIAZIDE (HYZAAR) 100-12.5 MG PER TABLET    TAKE ONE TABLET BY MOUTH DAILY       ALLERGIES     Ciprofloxacin, Prochlorperazine edisylate, Reglan [metoclopramide], Cefuroxime axetil, and Codeine    FAMILY HISTORY       Family History   Problem Relation Age of Onset    Heart Disease Mother     Diabetes Mother     Cancer Father         lung    Stroke Father     COPD Father     Diabetes Brother           SOCIAL HISTORY       Social History     Socioeconomic History    Marital status:      Spouse name: None    Number of children: None    Years of education: None    Highest education level: None   Occupational History    None   Social Needs    Financial resource strain: None    Food insecurity     Worry: None     Inability: None    Transportation needs     Medical: None     Non-medical: None   Tobacco Use    Smoking status: Never Smoker    Smokeless tobacco: Never Used   Substance and Sexual Activity    Alcohol use: No    Drug use: No    Sexual activity: None     Comment:    Lifestyle    Physical activity     Days per week: None     Minutes per session: None    Stress: None   Relationships    Social connections     Talks on phone: None     Gets together: None     Attends Faith service: None     Active member of club or organization: None     Attends meetings of clubs or organizations: None     Relationship status: None    Intimate partner violence     Fear of current or ex partner: None     Emotionally abused: None     Physically abused: None     Forced sexual activity: None   Other Topics Concern    None   Social History Narrative    None       SCREENINGS             PHYSICAL EXAM    (up to 7 for level 4, 8 or more for level 5)     ED Triage Vitals [12/21/20 1532]   BP Temp Temp Source Pulse Resp SpO2 Height Weight   (!) 143/86 98.1 °F (36.7 °C) Oral 86 16 96 % 5' 5.5\" (1.664 m) 229 lb 8 oz (104.1 kg)         Physical Exam   Constitutional: Awake and alert. Nontoxic. Not ill-appearing. Coughing occasionally. Head: No visible evidence of trauma. Normocephalic. Eyes: Pupils equal and reactive. No photophobia. Conjunctiva normal.    HENT: Oral mucosa moist.  Airway patent. Pharynx without erythema. Nares were clear. Neck:  Soft and supple. Nontender. Heart:  Regular rate and rhythm. No murmur.   Lungs:  Clear to auscultation. No wheezes, rales, or ronchi. No conversational dyspnea or accessory muscle use. Chest: Chest wall non-tender. No evidence of trauma. Abdomen:  Soft, nondistended, bowel sounds present. Nontender. No guarding rigidity or rebound. No masses. Unable to elicit any abdominal discomfort to palpation. Musculoskeletal: Extremities non-tender with full range of motion. Radial and dorsalis pedis pulses were intact. No calf tenderness erythema or edema. Neurological: Alert and oriented x 3. Speech clear. Cranial nerves II-XII intact. No facial droop. No acute focal motor or sensory deficits. Skin: Skin is warm and dry. No rash. Lymphatic:  No lympadenopathy. Psychiatric: Normal mood and affect. Behavior is normal.         DIAGNOSTIC RESULTS     EKG: All EKG's are interpreted by the Emergency Department Physician who either signs or Co-signs this chart in the absence of a cardiologist.        RADIOLOGY:   Non-plain film images such as CT, Ultrasound and MRI are read by the radiologist. Plain radiographic images are visualized and preliminarily interpreted by the emergency physician with the below findings:        Interpretation per the Radiologist below, if available at the time of this note:    XR CHEST PORTABLE   Final Result   Unremarkable portable chest radiograph.                ED BEDSIDE ULTRASOUND:   Performed by ED Physician - none    LABS:  Labs Reviewed   CBC WITH AUTO DIFFERENTIAL - Abnormal; Notable for the following components:       Result Value    WBC 3.9 (*)     RBC 5.45 (*)     All other components within normal limits    Narrative:     Performed at:  [address] [facility]  [address] [facility]  [facility]   Phone [phone]   COMPREHENSIVE METABOLIC PANEL W/ REFLEX TO MG FOR LOW K - Abnormal; Notable for the following components:    Glucose 101 (*)     Alkaline Phosphatase 132 (*)     AST 14 (*)     All other components within normal limits    Narrative:     Performed at:  [address]  [address] [facility]   Phone [phone]   STREP SCREEN GROUP A THROAT    Narrative:     Performed at:  [address]  [address] [facility]   Phone [phone]   RAPID INFLUENZA A/B ANTIGENS    Narrative:     Performed at:  [address] [place] [address]  [address] [facility]   Phone [phone]   CULTURE, BETA STREP CONFIRM PLATES   URINE RT REFLEX TO CULTURE    Narrative:     Performed at:  [facility]  [address] [facility]   Phone [phone]   MAGNESIUM       All other labs were within normal range or not returned as of this dictation. EMERGENCY DEPARTMENT COURSE and DIFFERENTIAL DIAGNOSIS/MDM:   Vitals:    Vitals:    12/21/20 1532   BP: (!) 143/86   Pulse: 86   Resp: 16   Temp: 98.1 °F (36.7 °C)   TempSrc: Oral   SpO2: 96%   Weight: 229 lb 8 oz (104.1 kg)   Height: 5' 5.5\" (1.664 m)         MDM      The patient presents with dry nonproductive cough, body aches, low-grade fever, sore throat, sinus drainage, and loss of taste and smell. She is also had some slight nausea and loss of appetite and is concerned that she is dehydrated. However, she is hemodynamically stable. She is afebrile. No evidence of hypoxia. Lungs are clear to auscultation. No evidence of respiratory distress. Chest x-ray was obtained. COVID-19 PCR was ordered. Symptoms are very suspicious for COVID-19. Covid test results are pending and will be expected within the next 2-3 business days. Until results are known she was advised to quarantine for 14 days or until Covid test is known to be negative. REASSESSMENT          She was given normal saline 1 L bolus and Zofran 4 mg IV.   Laboratory studies were obtained. 6:04 PM: Chest x-ray is negative. No evidence of dehydration. Strep screen is negative. Influenza screen is negative. Covid is pending. White blood cell count was slightly decreased at 3.9. Otherwise laboratory studies are unremarkable. Urinalysis is unremarkable. Chest x-ray reveals no radiographic evidence of pneumonia. Patient is hemodynamically stable, and stable for discharge and outpatient management. You are advised to self isolate for 14 days or until COVID-19 test is known to be negative. You are advised to stay home and do not leave the house unless absolutely necessary. If you do need to leave the house for an urgent reason, you must wear a mask at all times. Follow-up with a primary care physician by telephone within 1-2 business days to discuss whether or not you need a in person follow-up visit. Make sure that you wear a mask if a follow-up visit is required. Make sure that you review the COVID-19 isolation instructions and COVID-19 general instructions that are attached. Watch for chest pain, shortness of breath, or worsening symptoms. If condition worsens or new symptoms develop, return immediately to the emergency department. Drink plenty of fluids. Recommend Tylenol or ibuprofen as directed for pain or fever. CRITICAL CARE TIME   Total Critical Care time was 0 minutes, excluding separately reportable procedures. There was a high probability of clinically significant/life threatening deterioration in the patient's condition which required my urgent intervention. CONSULTS:  None    PROCEDURES:  Unless otherwise noted below, none     Procedures        FINAL IMPRESSION      1.  Suspected COVID-19 virus infection          DISPOSITION/PLAN   DISPOSITION        PATIENT REFERRED TO:  Anson Boast, MD  [address]  [phone]    Today        DISCHARGE MEDICATIONS:  New Prescriptions    ONDANSETRON (ZOFRAN ODT) 4 MG DISINTEGRATING TABLET    Take 1 tablet by mouth every 8 hours as needed for Nausea     Controlled Substances Monitoring:     No flowsheet data found. (Please note that portions of this note were completed with a voice recognition program.  Efforts were made to edit the dictations but occasionally words are mis-transcribed. )    [address] DO (electronically signed)  Attending Emergency Physician          Sheri Lennon DO  12/21/20 [phone]

## 2020-12-21 NOTE — ED TRIAGE NOTES
Pt. C/o being sick on & off for 8 months, has been on antibiotics several times since March, on Clindamycin since Wednesday. Lost taste and smell last week.

## 2020-12-22 ENCOUNTER — CARE COORDINATION (OUTPATIENT)
Dept: CARE COORDINATION | Age: 60
End: 2020-12-22

## 2020-12-22 LAB — SARS-COV-2: DETECTED

## 2020-12-22 NOTE — CARE COORDINATION
Patient contacted regarding recent discharge and COVID-19 risk. Discussed COVID-19 related testing which was available at this time. Test results were positive. Patient informed of results, if available? Yes     Care Transition Nurse/ Ambulatory Care Manager contacted the patient by telephone to perform post discharge assessment. Verified name and  with patient as identifiers. pt reports feeling about the same, cough, no appetite, loss of taste and smell, intermittent fevers controlling with tylenol. staes she has been trying to maintain adequate hydration. Reviewed covid results , safety precautions and cdc guidelines, pulse ox usage and when to seek care, with vu.  Pt ask about daughter results also. Daughter is developmentally delayed and basically non verbal. Contacted acm assigned to daughter while on phone with pt to see if this nurse could follow pt as I already had mother on phone. Discussed the mother the same for daughter. All questions addressed. Agreeable to Get Well Loop    Patient has following risk factors of: partial pneumonectomy. CTN/ACM reviewed discharge instructions, medical action plan and red flags related to discharge diagnosis. Reviewed and educated them on any new and changed medications related to discharge diagnosis. Advised obtaining a 90-day supply of all daily and as-needed medications. Education provided regarding infection prevention, and signs and symptoms of COVID-19 and when to seek medical attention with patient who verbalized understanding. Discussed exposure protocols and quarantine from [address] you at higher risk for severe illness  and given an opportunity for questions and concerns. The patient agrees to contact the COVID-19 hotline [phone] or PCP office for questions related to their healthcare. CTN/ACM provided contact information for future reference. From CDC: Are you at higher risk for severe illness?      Wash your hands often.   Avoid close contact (6 feet, which is about two arm lengths) with people who are sick.  Put distance between yourself and other people if COVID-19 is spreading in your community.  Clean and disinfect frequently touched surfaces.  Avoid all cruise travel and non-essential air travel.  Call your healthcare professional if you have concerns about COVID-19 and your underlying condition or if you are sick. For more information on steps you can take to protect yourself, see CDC's How to Protect Yourself    Pt will be further monitored by COVID Loop Team based on severity of symptoms and risk factors. Email: [email or web address]  Phone: [phone]

## 2020-12-23 LAB — S PYO THROAT QL CULT: NORMAL

## 2020-12-25 ENCOUNTER — CARE COORDINATION (OUTPATIENT)
Dept: CARE COORDINATION | Age: 60
End: 2020-12-25

## 2021-02-21 ENCOUNTER — HOSPITAL ENCOUNTER (EMERGENCY)
Age: 61
Discharge: HOME | End: 2021-02-21
Attending: EMERGENCY MEDICINE
Payer: COMMERCIAL

## 2021-02-21 ENCOUNTER — APPOINTMENT (OUTPATIENT)
Dept: GENERAL RADIOLOGY | Age: 61
End: 2021-02-21
Payer: COMMERCIAL

## 2021-02-21 VITALS
TEMPERATURE: 98.2 F | BODY MASS INDEX: 45.48 KG/M2 | HEART RATE: 80 BPM | OXYGEN SATURATION: 98 % | RESPIRATION RATE: 18 BRPM | WEIGHT: 273 LBS | HEIGHT: 65 IN

## 2021-02-21 DIAGNOSIS — S92.415A CLOSED NONDISPLACED FRACTURE OF PROXIMAL PHALANX OF LEFT GREAT TOE, INITIAL ENCOUNTER: Primary | ICD-10-CM

## 2021-02-21 PROCEDURE — 73630 X-RAY EXAM OF FOOT: CPT

## 2021-02-21 PROCEDURE — 99283 EMERGENCY DEPT VISIT LOW MDM: CPT

## 2021-02-21 ASSESSMENT — ENCOUNTER SYMPTOMS
SHORTNESS OF BREATH: 0
STRIDOR: 0
WHEEZING: 0
TROUBLE SWALLOWING: 0
NAUSEA: 0
FACIAL SWELLING: 0
COLOR CHANGE: 0
VOMITING: 0
ABDOMINAL PAIN: 0
VOICE CHANGE: 0

## 2021-02-21 ASSESSMENT — PAIN DESCRIPTION - LOCATION: LOCATION: FOOT

## 2021-02-21 ASSESSMENT — PAIN DESCRIPTION - ORIENTATION: ORIENTATION: LEFT

## 2021-02-21 ASSESSMENT — PAIN SCALES - GENERAL: PAINLEVEL_OUTOF10: 6

## 2021-02-21 ASSESSMENT — PAIN - FUNCTIONAL ASSESSMENT: PAIN_FUNCTIONAL_ASSESSMENT: 0-10

## 2021-02-21 NOTE — ED PROVIDER NOTES
[address] [facility]  eMERGENCY dEPARTMENT eNCOUnter      Pt Name: Jeanine Rubin  MRN: [id number]  [place] 1960  Date of evaluation: 2/21/2021  Provider: Arianna Ibrahim MD    CHIEF COMPLAINT     Fall, left foot pain      HISTORY OF PRESENT ILLNESS   (Location/Symptom, Timing/Onset, Context/Setting, Quality, Duration, Modifying Factors, Severity)  Note limiting factors. Jeanine Rubin is a 63 y.o. female presents with left foot pain after a mechanical fall. Patient reports she slipped on ice and landed on her left foot. She reports she had mild knee pain right after the fall however that is completely resolved and she not has any pain when bearing weight on the left leg but does report significant left distal foot pain. Reports her pain is moderate severe, constant, worsening. Reports bearing weight worsens the pain nothing improves it. Denies any numbness or weakness. HPI    Nursing Notes were reviewed. REVIEW OFSYSTEMS    (2-9 systems for level 4, 10 or more for level 5)     Review of Systems   Constitutional: Negative for appetite change, fever and unexpected weight change. HENT: Negative for facial swelling, trouble swallowing and voice change. Eyes: Negative for visual disturbance. Respiratory: Negative for shortness of breath, wheezing and stridor. Cardiovascular: Negative for chest pain and palpitations. Gastrointestinal: Negative for abdominal pain, nausea and vomiting. Genitourinary: Negative for dysuria and vaginal bleeding. Musculoskeletal: Positive for arthralgias and gait problem. Negative for neck pain and neck stiffness. Skin: Negative for color change and wound. Neurological: Negative for seizures and syncope. Psychiatric/Behavioral: Negative for self-injury and suicidal ideas. Except as noted above the remainder of the review of systems was reviewed and negative.        PAST MEDICAL HISTORY     Past Medical History:   Diagnosis Date    Acute rheumatic arthritis     Chronic kidney disease     freq UTIs    Headache(784.0)     migraines & dizziness    Hypertension     [facility] [address] [address]     s/p removal 2/3 of lung    Murmur, cardiac     Pneumonia     Rheumatic fever     1980    Seizures ([facility]     just as a baby         SURGICAL HISTORY       Past Surgical History:   Procedure Laterality Date    HYSTERECTOMY      patient still has ovaries    LUNG SURGERY Right 2007    Partial upper lobectomy         CURRENT MEDICATIONS       Previous Medications    ASPIRIN 81 MG CHEWABLE TABLET    Take 81 mg by mouth daily    LOSARTAN-HYDROCHLOROTHIAZIDE (HYZAAR) 100-12.5 MG PER TABLET    TAKE ONE TABLET BY MOUTH DAILY       ALLERGIES     Ciprofloxacin, Prochlorperazine edisylate, Reglan [metoclopramide], Cefuroxime axetil, and Codeine    FAMILY HISTORY       Family History   Problem Relation Age of Onset    Heart Disease Mother     Diabetes Mother     Cancer Father         lung    Stroke Father     COPD Father     Diabetes Brother           SOCIAL HISTORY       Social History     Socioeconomic History    Marital status:      Spouse name: None    Number of children: None    Years of education: None    Highest education level: None   Occupational History    None   Social Needs    Financial resource strain: None    Food insecurity     Worry: None     Inability: None    Transportation needs     Medical: None     Non-medical: None   Tobacco Use    Smoking status: Never Smoker    Smokeless tobacco: Never Used   Substance and Sexual Activity    Alcohol use: No    Drug use: No    Sexual activity: None     Comment:    Lifestyle    Physical activity     Days per week: None     Minutes per session: None    Stress: None   Relationships    Social connections     Talks on phone: None     Gets together: None     Attends Taoism service: None     Active member of club or organization: None     Attends meetings of clubs or organizations: None     Relationship status: None    Intimate partner violence     Fear of current or ex partner: None     Emotionally abused: None     Physically abused: None     Forced sexual activity: None   Other Topics Concern    None   Social History Narrative    None         PHYSICAL EXAM    (up to 7 for level 4, 8 or more for level 5)     Vitals:    02/21/21 1435   Pulse: 80   Resp: 18   Temp: 98.2 °F (36.8 °C)   TempSrc: Oral   SpO2: 98%   Weight: 273 lb (123.8 kg)   Height: 5' 5\" (1.651 m)         Physical Exam  Vitals signs and nursing note reviewed. Constitutional:       Appearance: She is well-developed. She is not diaphoretic. HENT:      Head: Normocephalic and atraumatic. Right Ear: External ear normal.      Left Ear: External ear normal.   Eyes:      Conjunctiva/sclera: Conjunctivae normal.   Neck:      Musculoskeletal: Neck supple. Vascular: No JVD. Trachea: No tracheal deviation. Cardiovascular:      Rate and Rhythm: Normal rate. Pulses: Normal pulses. Comments: 2+ DP and PT pulses left lower extremity. Normal cap refill to all toes of left foot. Pulmonary:      Effort: Pulmonary effort is normal. No respiratory distress. Breath sounds: Normal breath sounds. No wheezing. Abdominal:      General: There is no distension. Palpations: Abdomen is soft. Tenderness: There is no abdominal tenderness. There is no guarding or rebound. Musculoskeletal: Normal range of motion. General: Tenderness present. No deformity. Comments: Tenderness to first metatarsal and first proximal distal phalanx to left foot. No ankle or proximal foot tenderness. Skin:     General: Skin is warm and dry. Neurological:      General: No focal deficit present. Mental Status: She is alert and oriented to person, place, and time. Cranial Nerves: No cranial nerve deficit.       Comments: Sensation intact in all nerve distributions of left lower extremity DIAGNOSTIC RESULTS         RADIOLOGY:     Interpretation per the Radiologist below, if available at the time of this note:    XR FOOT LEFT (MIN 3 VIEWS)   Final Result   Defect at the distal aspect of the left 1st proximal phalanx, may be related   to degenerative changes. Acute nondisplaced intra-articular fracture cannot   be excluded. Degenerative changes. EMERGENCY DEPARTMENT COURSE and DIFFERENTIAL DIAGNOSIS/MDM:   Vitals:    Vitals:    02/21/21 1435   Pulse: 80   Resp: 18   Temp: 98.2 °F (36.8 °C)   TempSrc: Oral   SpO2: 98%   Weight: 273 lb (123.8 kg)   Height: 5' 5\" (1.651 m)         MDM  Plain film concerning for fracture to the distal aspect of the left first proximal phalanx which the patient has point tenderness to. I feel patient is appropriate for walking boot, rice therapy, and orthopedic clinic follow-up. Strict ER return precautions given for new or worsening symptoms. I offered the patient prescription pain medication but she is politely declined this stating she prefers to take minimal over-the-counter medicine. I estimate there is low risk for COMPARTMENT SYNDROME, DEEP VENOUS THROMBOSIS, SEPTIC ARTHRITIS, TENDON OR NEUROVASCULAR INJURY, thus I consider the discharge disposition reasonable. The patient and I have discussed the diagnosis and risks, and we agree with discharging home to follow-up with their primary doctor or the referral orthopedist. We also discussed returning to the Emergency Department immediately if new or worsening symptoms occur. We have discussed the symptoms which are most concerning (e.g., changing or worsening pain, numbness, weakness) that necessitate immediate return           Procedures    FINAL IMPRESSION      1.  Closed nondisplaced fracture of proximal phalanx of left great toe, initial encounter          DISPOSITION/PLAN   DISPOSITION  Discharge      PATIENT REFERRED TO:  [address] [id number] [phone]  In 2 days  Ask for Ortho clinic appointment for great toe fracture    [facility]  [address]  [phone]    If symptoms worsen         (Please note that portions of this note were completed with a voice recognition program.  Efforts were made to edit the dictations but occasionally words aremis-transcribed. )    Rashmi Schulz MD (electronically signed)  Attending Emergency Physician           Rashmi Schulz MD  02/21/21 2107

## 2021-02-24 ENCOUNTER — OFFICE VISIT (OUTPATIENT)
Dept: ORTHOPEDIC SURGERY | Age: 61
End: 2021-02-24
Payer: COMMERCIAL

## 2021-02-24 VITALS
HEIGHT: 65 IN | TEMPERATURE: 97.9 F | DIASTOLIC BLOOD PRESSURE: 74 MMHG | WEIGHT: 273 LBS | BODY MASS INDEX: 45.48 KG/M2 | SYSTOLIC BLOOD PRESSURE: 145 MMHG

## 2021-02-24 DIAGNOSIS — S92.412A CLOSED DISPLACED FRACTURE OF PROXIMAL PHALANX OF LEFT GREAT TOE, INITIAL ENCOUNTER: Primary | ICD-10-CM

## 2021-02-24 DIAGNOSIS — S99.922A FOOT INJURY, LEFT, INITIAL ENCOUNTER: ICD-10-CM

## 2021-02-24 PROCEDURE — L3260 AMBULATORY SURGICAL BOOT EAC: HCPCS | Performed by: ORTHOPAEDIC SURGERY

## 2021-02-24 PROCEDURE — 99213 OFFICE O/P EST LOW 20 MIN: CPT | Performed by: ORTHOPAEDIC SURGERY

## 2021-02-24 PROCEDURE — 28490 TREAT BIG TOE FRACTURE: CPT | Performed by: ORTHOPAEDIC SURGERY

## 2021-02-24 NOTE — PROGRESS NOTES
CHIEF COMPLAINT: Left foot pain/ first (great toe) proximal phalanx minimally displaced fracture. DATE OF INJURY: 2/20/2021, DOT: 2/24/2021    HISTORY:  Ms. Yefri Solis 63 y.o.  female presents today for the first visit for evaluation of a left foot injury which occurred when she fell after she slipped on the ice and landed with her left foot underneath her. She is complaining of left foot first (great toe) pain and swelling. This is better with elevation and worse with bearing any wt. The pain is sharp and not radiating. No other complaint. She was seen 1st at [facility], where she was x-rayed and placed in a boot and asked to f/u with orthopaedics. Denies smoking.     Past Medical History:   Diagnosis Date    Acute rheumatic arthritis     Chronic kidney disease     freq UTIs    Headache(784.0)     migraines & dizziness    Hypertension     Lung cancer ([address]     s/p removal 2/3 of lung    Murmur, cardiac     Pneumonia     Rheumatic fever     1980    Seizures ([address]     just as a baby       Past Surgical History:   Procedure Laterality Date    HYSTERECTOMY      patient still has ovaries    LUNG SURGERY Right 2007    Partial upper lobectomy       Social History     Socioeconomic History    Marital status:      Spouse name: Not on file    Number of children: Not on file    Years of education: Not on file    Highest education level: Not on file   Occupational History    Not on file   Social Needs    Financial resource strain: Not on file    Food insecurity     Worry: Not on file     Inability: Not on file    Transportation needs     Medical: Not on file     Non-medical: Not on file   Tobacco Use    Smoking status: Never Smoker    Smokeless tobacco: Never Used   Substance and Sexual Activity    Alcohol use: No    Drug use: No    Sexual activity: Not on file     Comment:    Lifestyle    Physical activity     Days per week: Not on file     Minutes per session: Not on file    Stress: Not on file   Relationships    Social connections     Talks on phone: Not on file     Gets together: Not on file     Attends [employer] service: Not on file     Active member of club or organization: Not on file     Attends meetings of clubs or organizations: Not on file     Relationship status: Not on file    Intimate partner violence     Fear of current or ex partner: Not on file     Emotionally abused: Not on file     Physically abused: Not on file     Forced sexual activity: Not on file   Other Topics Concern    Not on file   Social History Narrative    Not on file       Family History   Problem Relation Age of Onset    Heart Disease Mother     Diabetes Mother     Cancer Father         lung    Stroke Father     COPD Father     Diabetes Brother        Current Outpatient Medications on File Prior to Visit   Medication Sig Dispense Refill    aspirin 81 MG chewable tablet Take 81 mg by mouth daily      losartan-hydrochlorothiazide (HYZAAR) 100-12.5 MG per tablet TAKE ONE TABLET BY MOUTH DAILY 30 tablet 5     No current facility-administered medications on file prior to visit. Pertinent items are noted in HPI  Review of systems reviewed from Patient History Form dated on 2/24/2021 and available in the patient's chart under the Media tab. No change noted. PHYSICAL EXAMINATION:  Ms. Jerry Stafford is a very pleasant 63 y.o.  female who presents today in no acute distress, awake, alert, and oriented. She is well dressed, nourished and  groomed. Patient with normal affect. Height is  5' 5\" (1.651 m), weight is 273 lb (123.8 kg), Body mass index is 45.43 kg/m². Resting respiratory rate is 16. Examination of the gait, showed that the patient walks with a limp in a boot, PWB left leg. Examination of both feet and ankles showing a decreased range of motion of the left foot first (great toe) compare to the other side because of pain.   There is moderate swelling that can be seen, as well as ecchymosis over first (great toe) of the right foot. She has intact sensation and good pedal pulses. She has significant tenderness on deep palpation over the first (great toe) proximal phalanx left foot                IMAGING: Xray's were reviewed, dated 2/21/2021 at [facility], 3 views of the left foot, and showed a first (great toe) proximal phalanx minimally displaced fracture. IMPRESSION: Left foot first (great toe) proximal phalanx minimally displaced fracture. PLAN: I discussed that the overall alignment of this fracture is good and that we can try to treat this non-operatively in a post-op shoe with full WB and strapping. We discussed the risk of nonunion and  malunion. We will see her  back in 6 weeks at which time we will get a new xray of the left foot. PROCEDURE:    With the patient's permission, the left first and second toes were strapped and tapped with coband. The patient tolerated the procedure well. Procedures    Darco Post-op Shoe Brace     Patient was prescribed a Darco Post-Op Shoe. The left foot will require stabilization / immobilization from this semi-rigid / rigid orthosis to improve their function. The orthosis will assist in protecting the affected area, provide functional support and facilitate healing. Patient was instructed to progress ambulation weight bearing as tolerated in the device. The patient was educated and fit by a healthcare professional with expert knowledge and specialization in brace application while under the direct supervision of the treating physician. Verbal and written instructions for the use of and application of this item were provided. They were instructed to contact the office immediately should the brace result in increased pain, decreased sensation, increased swelling or worsening of the condition.        Hao Cummings MD

## 2021-02-27 PROBLEM — S92.412A CLOSED DISPLACED FRACTURE OF PROXIMAL PHALANX OF LEFT GREAT TOE: Status: ACTIVE | Noted: 2021-02-27

## 2021-05-01 LAB
A/G RATIO: 1.3 (ref 1.1–2.2)
ALBUMIN SERPL-MCNC: 4 G/DL (ref 3.4–5)
ALP BLD-CCNC: 108 U/L (ref 40–129)
ALT SERPL-CCNC: 12 U/L (ref 10–40)
ANION GAP SERPL CALCULATED.3IONS-SCNC: 10 MMOL/L (ref 3–16)
AST SERPL-CCNC: 18 U/L (ref 15–37)
BILIRUB SERPL-MCNC: 0.5 MG/DL (ref 0–1)
BUN BLDV-MCNC: 15 MG/DL (ref 7–20)
CALCIUM SERPL-MCNC: 9.2 MG/DL (ref 8.3–10.6)
CHLORIDE BLD-SCNC: 105 MMOL/L (ref 99–110)
CO2: 27 MMOL/L (ref 21–32)
CREAT SERPL-MCNC: 0.7 MG/DL (ref 0.6–1.1)
GFR AFRICAN AMERICAN: >60
GFR NON-AFRICAN AMERICAN: >60
GLOBULIN: 3 G/DL
GLUCOSE BLD-MCNC: 95 MG/DL (ref 70–99)
HCT VFR BLD CALC: 42.1 % (ref 36–48)
HEMOGLOBIN: 14.4 G/DL (ref 12–16)
MCH RBC QN AUTO: 28.6 PG (ref 26–34)
MCHC RBC AUTO-ENTMCNC: 34.1 G/DL (ref 31–36)
MCV RBC AUTO: 84 FL (ref 80–100)
PDW BLD-RTO: 14 % (ref 12.4–15.4)
PLATELET # BLD: 216 K/UL (ref 135–450)
PMV BLD AUTO: 10.5 FL (ref 5–10.5)
POTASSIUM SERPL-SCNC: 4.1 MMOL/L (ref 3.5–5.1)
RBC # BLD: 5.02 M/UL (ref 4–5.2)
SODIUM BLD-SCNC: 142 MMOL/L (ref 136–145)
T3 TOTAL: 1.37 NG/ML (ref 0.8–2)
T4 TOTAL: 7.8 UG/DL (ref 4.5–10.9)
TOTAL PROTEIN: 7 G/DL (ref 6.4–8.2)
TSH SERPL DL<=0.05 MIU/L-ACNC: 3.71 UIU/ML (ref 0.27–4.2)
WBC # BLD: 6.6 K/UL (ref 4–11)

## 2021-05-05 ENCOUNTER — OFFICE VISIT (OUTPATIENT)
Dept: ENT CLINIC | Age: 61
End: 2021-05-05
Payer: COMMERCIAL

## 2021-05-05 VITALS
HEART RATE: 57 BPM | WEIGHT: 234 LBS | SYSTOLIC BLOOD PRESSURE: 159 MMHG | BODY MASS INDEX: 38.94 KG/M2 | DIASTOLIC BLOOD PRESSURE: 74 MMHG

## 2021-05-05 DIAGNOSIS — H61.23 IMPACTED CERUMEN OF BOTH EARS: ICD-10-CM

## 2021-05-05 DIAGNOSIS — H93.13 SUBJECTIVE TINNITUS OF BOTH EARS: Primary | ICD-10-CM

## 2021-05-05 DIAGNOSIS — H90.0 CONDUCTIVE HEARING LOSS OF BOTH EARS: ICD-10-CM

## 2021-05-05 DIAGNOSIS — E04.9 GOITER: ICD-10-CM

## 2021-05-05 PROCEDURE — 99203 OFFICE O/P NEW LOW 30 MIN: CPT | Performed by: OTOLARYNGOLOGY

## 2021-05-05 ASSESSMENT — ENCOUNTER SYMPTOMS
VOICE CHANGE: 0
SORE THROAT: 0
SINUS PAIN: 0
TROUBLE SWALLOWING: 0
RHINORRHEA: 0

## 2021-05-05 NOTE — PROGRESS NOTES
[facility]       NEW PATIENT VISIT      PCP:  Umm Fraser MD      REFERRED BY:   self      CHIEF COMPLAINT  Chief Complaint   Patient presents with    Tinnitus     buzzing in the ears        HISTORY OF PRESENT ILLNESS       Raudel Llanes is a 63 y.o. female here for evaluation and treatment of buzzing in both ears, more in the left. onset \"about March 25 when I got sick diarrhea rash pain in my side. Urine test showed e coli, had URI. Has not cleared up still on atnbiotics since march 25. Never had buzzing in ear prior to march 25. No hearing loss. Had COVID-19 in the end of decemnber lost tast and smell and just starting to come back. REVIEW OF SYSTEMS   Review of Systems   Constitutional: Negative for chills, fever and unexpected weight change. HENT: Negative for congestion, ear discharge, ear pain, rhinorrhea, sinus pain, sore throat, trouble swallowing and voice change. Neurological: Negative for dizziness. PAST MEDICAL HISTORY    Past Medical History:   Diagnosis Date    Acute rheumatic arthritis     Chronic kidney disease     freq UTIs    Headache(784.0)     migraines & dizziness    Hypertension     Lung cancer ([address]     s/p removal 2/3 of lung    Murmur, cardiac     Pneumonia     Rheumatic fever     1980    Seizures ([address]     just as a baby         Past Surgical History:   Procedure Laterality Date    HYSTERECTOMY      patient still has ovaries    LUNG SURGERY Right 2007    Partial upper lobectomy         EXAMINATION      Vitals:    05/05/21 1326   BP: (!) 159/74   Site: Left Upper Arm   Position: Sitting   Cuff Size: Medium Adult   Pulse: 57   Weight: 234 lb (106.1 kg)         Physical Exam  Vitals signs reviewed. Constitutional:       General: She is awake. She is not in acute distress. Appearance: Normal appearance. She is well-developed.  She is not ill-appearing or month (around 6/5/2021) for recheck/follow-up, and sooner if condition worsens.           MEDICAL DECISION MAKING    # and complexity of problems addressed:  33139 - Moderate  1 undiagnosed new problem with uncertain prognosis    Amount and/or Complexity of Data to be Reviewed and Analyzed  99292 - low - 1 of 2 categories  Cat 1 - 2 of following:  Ordered 2 tests     Risk of Complications and /or Morbidity or Mortality of Patient Management  20106 - Low

## 2021-06-18 ENCOUNTER — TELEPHONE (OUTPATIENT)
Dept: ENT CLINIC | Age: 61
End: 2021-06-18

## 2021-06-18 NOTE — TELEPHONE ENCOUNTER
Cortez Stevenson from Dr. John Osorio office is calling stating they did not receive any information about the patient being seen here in the office or a report of any findings from the office visit.      Fax number is [phone]

## 2021-06-19 ENCOUNTER — HOSPITAL ENCOUNTER (OUTPATIENT)
Dept: ULTRASOUND IMAGING | Age: 61
Discharge: HOME | End: 2021-06-19
Payer: COMMERCIAL

## 2021-06-19 DIAGNOSIS — E04.9 GOITER: ICD-10-CM

## 2021-06-19 PROCEDURE — 76536 US EXAM OF HEAD AND NECK: CPT

## 2021-06-21 ENCOUNTER — OFFICE VISIT (OUTPATIENT)
Dept: ENT CLINIC | Age: 61
End: 2021-06-21
Payer: COMMERCIAL

## 2021-06-21 VITALS — HEART RATE: 72 BPM | DIASTOLIC BLOOD PRESSURE: 80 MMHG | SYSTOLIC BLOOD PRESSURE: 177 MMHG

## 2021-06-21 DIAGNOSIS — R42 DIZZINESS: ICD-10-CM

## 2021-06-21 DIAGNOSIS — H93.13 SUBJECTIVE TINNITUS OF BOTH EARS: ICD-10-CM

## 2021-06-21 DIAGNOSIS — E04.2 MULTINODULAR GOITER (NONTOXIC): ICD-10-CM

## 2021-06-21 DIAGNOSIS — K14.1 GEOGRAPHICAL TONGUE: ICD-10-CM

## 2021-06-21 DIAGNOSIS — R42 VERTIGO: ICD-10-CM

## 2021-06-21 DIAGNOSIS — E04.1 THYROID NODULE: Primary | ICD-10-CM

## 2021-06-21 PROCEDURE — 99215 OFFICE O/P EST HI 40 MIN: CPT | Performed by: OTOLARYNGOLOGY

## 2021-06-21 ASSESSMENT — ENCOUNTER SYMPTOMS
SORE THROAT: 0
RHINORRHEA: 0
SINUS PAIN: 0

## 2021-06-21 NOTE — PROGRESS NOTES
[facility]         PCP:  Cely Walker MD      [facility]  Chief Complaint   Patient presents with    Dizziness    Thyroid Problem     nodules    Tinnitus       HISTORY OF PRESENT ILLNESS       Sarabjit White is a 63 y.o. female here for evaluation and treatment of dizziness. Also here for FU thyroid and tinnitis. Did not get hearing test.      Brother had thyroid cancer. She has had no radiation therapy or exposure. Had lung cancer and had part of lung removed 2007. Vertigo/dizziness started Wednesday. The severity of the dizziness is severe. The timing is intermittent, off and on. The duration of this problem is 25 years. The onset of this problem occurred about 58. Has seen ENT and neurologist to evaluate dizziness. Modifying factors include \"just not moving too much or not bending down. \"  Associated symptoms include feels \"fuzzy headed, nausea, no vomiting. \"  NO LOC. NO seizures. \"I can't move my head much or all of a sudden. When I'm having an episode of dizziness, laying down on back or sides. \"  Dizziness was described as a sensation of \"fuzzy headed\", \"like you're swooshing\" \"I don't think I can describe it\" \"like I'm off balance\"  No sensation of  \"the room or environment spinning\", whirling, \"I am spinning\",nor \"like I'm going to pass out. \"  Dizziness is precipitated \"if I make a sudden movement or bend over. \"  No other precipitating factors have been noted.          REVIEW OF SYSTEMS   Review of Systems   Constitutional: Negative for chills, fever and unexpected weight change. HENT: Positive for tinnitus (i cant tell if it's still there mostlyi its not there like it was. I don't know. ). Negative for congestion, ear discharge, ear pain, hearing loss, rhinorrhea, sinus pain and sore throat.          PAST MEDICAL HISTORY    Past Medical History:   Diagnosis Date    Acute rheumatic arthritis     Chronic kidney disease     freq UTIs    Headache(784.0)     migraines & dizziness    Hypertension     [address] [address]     s/p removal 2/3 of lung    Murmur, cardiac     Pneumonia     Rheumatic fever     1980    Seizures ([address]     just as a baby         Past Surgical History:   Procedure Laterality Date    HYSTERECTOMY      patient still has ovaries    LUNG SURGERY Right 2007    Partial upper lobectomy         EXAMINATION      Vitals:    06/21/21 0919   BP: (!) 177/80   Site: Left Upper Arm   Position: Sitting   Cuff Size: Medium Adult   Pulse: 72       Physical Exam  Vitals reviewed. Constitutional:       General: She is awake. She is not in acute distress. Appearance: Normal appearance. She is well-developed. She is not ill-appearing or toxic-appearing. HENT:      Head: Normocephalic and atraumatic. Salivary Glands: Right salivary gland is not diffusely enlarged or tender. Left salivary gland is not diffusely enlarged or tender. Right Ear: Tympanic membrane, ear canal and external ear normal.      Left Ear: Tympanic membrane, ear canal and external ear normal.      Nose: Nose normal. No nasal deformity, septal deviation, mucosal edema, congestion or rhinorrhea. Right Turbinates: Not enlarged. Left Turbinates: Not enlarged. Right Sinus: No maxillary sinus tenderness or frontal sinus tenderness. Left Sinus: No maxillary sinus tenderness or frontal sinus tenderness. Mouth/Throat:      Lips: Pink. No lesions. Mouth: Mucous membranes are moist. No oral lesions. Tongue: Lesions (geographic tongue) present. Palate: No mass and lesions. Pharynx: Oropharynx is clear. Uvula midline. No oropharyngeal exudate or posterior oropharyngeal erythema. Tonsils: No tonsillar exudate or tonsillar abscesses. Neck:      Thyroid: No thyroid mass, thyromegaly or thyroid tenderness. Trachea: No tracheal deviation.    Musculoskeletal:      Cervical back: Normal range of motion and neck supple. No rigidity. No muscular tenderness. Lymphadenopathy:      Cervical: No cervical adenopathy. Neurological:      Mental Status: She is alert. REVIEW OF IMAGES         I independently reviewed the images of the thyroid ultrasound, showing a right goiter and a left lobe nodule for which FNA was recommended by the audiologist.  See images and report for details. Narrative   EXAMINATION:   THYROID ULTRASOUND       6/19/2021     FINDINGS:   Right thyroid lobe:  4.4 x 2.8 x 2.4 cm       Left thyroid lobe:  3.5 x 1.4 x 1.8 cm       Isthmus:  2.3 cm       Thyroid Gland:  Thyroid gland is inhomogeneous.  Vascularity is normal.       Nodules: 2 nodules are described below.       NODULE: Right 1       Size: 25 x 21 x 22 mm       Location: Superior       1.  Composition:  Mixed cystic and solid (1)       2.  Echogenicity:  Isoechoic (1)       3.  Shape: Wider than tall (0)       4.  Margins:  Smooth (0)       5. Echogenic foci:  None (0)       ACR TI-RADS total points:  2       ACR TI-RADS risk category: TR 2       Recommendation:  No follow-up necessary       NODULE: Left 1       Size: 11 x 9 x 10 mm       Location: Inferior       1.  Composition:  Solid (2)       2.  Echogenicity:  Hypoechoic (2)       3.  Shape: Wider than tall (0)       4.  Margins:  Smooth (0)       5. Echogenic foci:  Punctate echogenic foci (3).  Foci are demonstrated on   static and cine images.       ACR TI-RADS total points:  7       ACR TI-RADS risk category: TR 5       Recommendation:  FNA       Cervical lymphadenopathy: No abnormal lymph nodes in the imaged portions of   the neck.           Impression   11 mm TR 5 nodule in the left lobe.  Fine-needle aspiration recommended. Ang Dickerson / Burnadette Schaumann / Kenyon Barone was seen today for dizziness, thyroid problem and tinnitus.     Diagnoses and all orders for this visit:    Thyroid nodule  -     US BIOPSY THYROID    Multinodular goiter (nontoxic)    Vertigo  -     [facility] - Sammy Maru Oshea Au.D., [facility] Testing, [place]    Dizziness  -     Imelda Blancoenic Maru Oshea Au.D., [facility] Testing, [place]    Subjective tinnitus of both ears    Geographical tongue        RECOMMENDATIONS/PLAN      1. Proceed with audiogram, ordered at the last visit. 2. Ordered electronystagmogram (ENG)  3. Ordered FNA thyroid nodule left 1.    4. Meclizine (OTC) prn dizziness  5. See Patient Instructions on file for this visit, which were discussed with the patient. 6. Return for recheck/follow-up after audiogram and ENG testing and fine needle aspiration of left thyroid nodule. MEDICAL DECISION MAKING  TIME:  I spent a total of 42  minutes with this patient for evaluation and management, history and physical examination, counseling, coordination of care, reviewing the medical record, and documenting the visit.

## 2021-06-21 NOTE — PATIENT INSTRUCTIONS
medication.  (promethazine/Phenergan)  · Anti motion sickness medications (Dramamine)     · Aspirin or aspirin substitutes (Tylenol)  · Antidepressant medications;   · Alcohol (any form, beer wine whisky vodka, etc)  · Discontinued all medications, for 48 hours prior to the testing, except \"maintenance\" medications for your heart, blood pressure, diabetes, or seizures, and any medications deemed by your primary physician to be necessary. DO NOT STOP anti-seizure medications. Please consult your primary physician with any questions. MAKE SURE YOU CLEAR IT WITH YOUR PRIMARY CARE PHYSICIAN BEFORE STOPPING ANY MEDICATIONS. ADDITIONAL INSTRUCTIONS:  · Have a light breakfast and/or lunch on the day of testing. Diabetic patients must light breakfast or lunch prior to testing. · Clean face, no makeup. · Remove contact lenses before the examination. (Bring your eyeglasses). · No solid foods for 2-4 hours before testing. · No coffee, tea, or cola after midnight on the day of testing. · No alcohol containing beverages or liquid medication containing alcohol for 48 hours before the testing. · Someone will need to drive you home after the ENG testing. Do not drive an automobile for two hours after the ENG testing. · Wear comfortable loose fitting clothing on the day of testing.

## 2021-07-08 ENCOUNTER — HOSPITAL ENCOUNTER (OUTPATIENT)
Dept: ULTRASOUND IMAGING | Age: 61
Discharge: HOME | End: 2021-07-08
Payer: COMMERCIAL

## 2021-07-08 VITALS
HEIGHT: 65 IN | RESPIRATION RATE: 16 BRPM | BODY MASS INDEX: 39.15 KG/M2 | HEART RATE: 87 BPM | WEIGHT: 235 LBS | DIASTOLIC BLOOD PRESSURE: 79 MMHG | SYSTOLIC BLOOD PRESSURE: 187 MMHG | TEMPERATURE: 97.1 F

## 2021-07-08 DIAGNOSIS — E04.1 THYROID NODULE: ICD-10-CM

## 2021-07-08 PROCEDURE — 76536 US EXAM OF HEAD AND NECK: CPT

## 2021-07-08 RX ORDER — BACITRACIN, NEOMYCIN, POLYMYXIN B 400; 3.5; 5 [USP'U]/G; MG/G; [USP'U]/G
OINTMENT TOPICAL ONCE
Status: DISCONTINUED | OUTPATIENT
Start: 2021-07-08 | End: 2021-07-09 | Stop reason: HOSPADM

## 2021-07-08 RX ORDER — LIDOCAINE HYDROCHLORIDE 10 MG/ML
5 INJECTION, SOLUTION EPIDURAL; INFILTRATION; INTRACAUDAL; PERINEURAL ONCE
Status: DISCONTINUED | OUTPATIENT
Start: 2021-07-08 | End: 2021-07-09 | Stop reason: HOSPADM

## 2021-07-23 LAB
A/G RATIO: 1.5 (ref 1.1–2.2)
ALBUMIN SERPL-MCNC: 4.4 G/DL (ref 3.4–5)
ALP BLD-CCNC: 167 U/L (ref 40–129)
ALT SERPL-CCNC: 14 U/L (ref 10–40)
ANION GAP SERPL CALCULATED.3IONS-SCNC: 12 MMOL/L (ref 3–16)
AST SERPL-CCNC: 11 U/L (ref 15–37)
BILIRUB SERPL-MCNC: 0.5 MG/DL (ref 0–1)
BUN BLDV-MCNC: 16 MG/DL (ref 7–20)
C-REACTIVE PROTEIN: 24 MG/L (ref 0–5.1)
CALCIUM SERPL-MCNC: 9.8 MG/DL (ref 8.3–10.6)
CHLORIDE BLD-SCNC: 104 MMOL/L (ref 99–110)
CO2: 27 MMOL/L (ref 21–32)
CREAT SERPL-MCNC: 0.8 MG/DL (ref 0.6–1.1)
GFR AFRICAN AMERICAN: >60
GFR NON-AFRICAN AMERICAN: >60
GLOBULIN: 3 G/DL
GLUCOSE BLD-MCNC: 88 MG/DL (ref 70–99)
HCT VFR BLD CALC: 41.8 % (ref 36–48)
HEMOGLOBIN: 14.3 G/DL (ref 12–16)
MCH RBC QN AUTO: 28.3 PG (ref 26–34)
MCHC RBC AUTO-ENTMCNC: 34.1 G/DL (ref 31–36)
MCV RBC AUTO: 82.9 FL (ref 80–100)
PDW BLD-RTO: 13.7 % (ref 12.4–15.4)
PLATELET # BLD: 242 K/UL (ref 135–450)
PMV BLD AUTO: 9.1 FL (ref 5–10.5)
POTASSIUM SERPL-SCNC: 3.8 MMOL/L (ref 3.5–5.1)
RBC # BLD: 5.04 M/UL (ref 4–5.2)
RHEUMATOID FACTOR: 211 IU/ML
SEDIMENTATION RATE, ERYTHROCYTE: 29 MM/HR (ref 0–30)
SODIUM BLD-SCNC: 143 MMOL/L (ref 136–145)
T3 TOTAL: 1.22 NG/ML (ref 0.8–2)
T4 TOTAL: 7 UG/DL (ref 4.5–10.9)
TOTAL PROTEIN: 7.4 G/DL (ref 6.4–8.2)
TSH SERPL DL<=0.05 MIU/L-ACNC: 1.93 UIU/ML (ref 0.27–4.2)
WBC # BLD: 10.1 K/UL (ref 4–11)

## 2021-07-26 LAB — LYME, EIA: 0.43 LIV (ref 0–1.2)

## 2021-07-27 LAB — TSH RECEPTOR AB: <0.9 IU/L

## 2022-05-03 ENCOUNTER — HOSPITAL ENCOUNTER (OUTPATIENT)
Age: 62
Discharge: HOME | End: 2022-05-03
Payer: COMMERCIAL

## 2022-05-03 ENCOUNTER — HOSPITAL ENCOUNTER (OUTPATIENT)
Dept: GENERAL RADIOLOGY | Age: 62
Discharge: HOME | End: 2022-05-03
Payer: COMMERCIAL

## 2022-05-03 DIAGNOSIS — M19.90 ARTHRITIS: ICD-10-CM

## 2022-05-03 PROCEDURE — 73630 X-RAY EXAM OF FOOT: CPT

## 2024-01-26 ENCOUNTER — HOSPITAL ENCOUNTER (OUTPATIENT)
Dept: GENERAL RADIOLOGY | Age: 64
Discharge: HOME | End: 2024-01-26
Payer: COMMERCIAL

## 2024-01-26 ENCOUNTER — HOSPITAL ENCOUNTER (OUTPATIENT)
Age: 64
Discharge: HOME | End: 2024-01-26
Payer: COMMERCIAL

## 2024-01-26 DIAGNOSIS — M19.90 ARTHRITIS: ICD-10-CM

## 2024-01-26 PROCEDURE — 73610 X-RAY EXAM OF ANKLE: CPT

## 2024-01-26 PROCEDURE — 73630 X-RAY EXAM OF FOOT: CPT

## 2024-09-14 ENCOUNTER — HOSPITAL ENCOUNTER (EMERGENCY)
Age: 64
Discharge: HOME | End: 2024-09-15
Attending: EMERGENCY MEDICINE

## 2024-09-14 DIAGNOSIS — R04.0 EPISTAXIS: Primary | ICD-10-CM

## 2024-09-14 PROCEDURE — 99283 EMERGENCY DEPT VISIT LOW MDM: CPT

## 2024-09-14 ASSESSMENT — PAIN SCALES - GENERAL: PAINLEVEL_OUTOF10: 0

## 2024-09-14 ASSESSMENT — PAIN - FUNCTIONAL ASSESSMENT: PAIN_FUNCTIONAL_ASSESSMENT: 0-10

## 2024-09-15 VITALS
BODY MASS INDEX: 40.98 KG/M2 | DIASTOLIC BLOOD PRESSURE: 58 MMHG | WEIGHT: 246 LBS | SYSTOLIC BLOOD PRESSURE: 150 MMHG | RESPIRATION RATE: 13 BRPM | OXYGEN SATURATION: 98 % | HEIGHT: 65 IN | TEMPERATURE: 98.3 F | HEART RATE: 77 BPM

## 2024-09-15 LAB
ANION GAP SERPL CALCULATED.3IONS-SCNC: 12 MMOL/L (ref 3–16)
BASOPHILS # BLD: 0.1 K/UL (ref 0–0.2)
BASOPHILS NFR BLD: 0.5 %
BUN SERPL-MCNC: 18 MG/DL (ref 7–20)
CALCIUM SERPL-MCNC: 9.5 MG/DL (ref 8.3–10.6)
CHLORIDE SERPL-SCNC: 103 MMOL/L (ref 99–110)
CO2 SERPL-SCNC: 25 MMOL/L (ref 21–32)
CREAT SERPL-MCNC: 0.9 MG/DL (ref 0.6–1.2)
DEPRECATED RDW RBC AUTO: 13.7 % (ref 12.4–15.4)
EOSINOPHIL # BLD: 0 K/UL (ref 0–0.6)
EOSINOPHIL NFR BLD: 0.3 %
GFR SERPLBLD CREATININE-BSD FMLA CKD-EPI: 72 ML/MIN/{1.73_M2}
GLUCOSE SERPL-MCNC: 135 MG/DL (ref 70–99)
HCT VFR BLD AUTO: 40 % (ref 36–48)
HGB BLD-MCNC: 13.5 G/DL (ref 12–16)
INR PPP: 0.99 (ref 0.85–1.15)
LYMPHOCYTES # BLD: 1.1 K/UL (ref 1–5.1)
LYMPHOCYTES NFR BLD: 8.6 %
MAGNESIUM SERPL-MCNC: 2.04 MG/DL (ref 1.8–2.4)
MCH RBC QN AUTO: 27.7 PG (ref 26–34)
MCHC RBC AUTO-ENTMCNC: 33.8 G/DL (ref 31–36)
MCV RBC AUTO: 81.8 FL (ref 80–100)
MONOCYTES # BLD: 0.6 K/UL (ref 0–1.3)
MONOCYTES NFR BLD: 4.3 %
NEUTROPHILS # BLD: 11 K/UL (ref 1.7–7.7)
NEUTROPHILS NFR BLD: 86.3 %
PLATELET # BLD AUTO: 233 K/UL (ref 135–450)
PMV BLD AUTO: 8.8 FL (ref 5–10.5)
POTASSIUM SERPL-SCNC: 3.5 MMOL/L (ref 3.5–5.1)
PROTHROMBIN TIME: 13.3 SEC (ref 11.9–14.9)
RBC # BLD AUTO: 4.89 M/UL (ref 4–5.2)
SODIUM SERPL-SCNC: 140 MMOL/L (ref 136–145)
WBC # BLD AUTO: 12.7 K/UL (ref 4–11)

## 2024-09-15 PROCEDURE — 83735 ASSAY OF MAGNESIUM: CPT

## 2024-09-15 PROCEDURE — 6370000000 HC RX 637 (ALT 250 FOR IP): Performed by: EMERGENCY MEDICINE

## 2024-09-15 PROCEDURE — 80048 BASIC METABOLIC PNL TOTAL CA: CPT

## 2024-09-15 PROCEDURE — 85025 COMPLETE CBC W/AUTO DIFF WBC: CPT

## 2024-09-15 PROCEDURE — 85610 PROTHROMBIN TIME: CPT

## 2024-09-15 RX ORDER — OXYMETAZOLINE HYDROCHLORIDE 0.05 G/100ML
2 SPRAY NASAL ONCE
Status: COMPLETED | OUTPATIENT
Start: 2024-09-15 | End: 2024-09-15

## 2024-09-15 RX ADMIN — OXYMETAZOLINE HYDROCHLORIDE 2 SPRAY: 0.5 SPRAY NASAL at 00:54

## 2024-09-16 ENCOUNTER — OFFICE VISIT (OUTPATIENT)
Dept: ENT CLINIC | Age: 64
End: 2024-09-16

## 2024-09-16 VITALS
SYSTOLIC BLOOD PRESSURE: 136 MMHG | OXYGEN SATURATION: 98 % | DIASTOLIC BLOOD PRESSURE: 68 MMHG | WEIGHT: 244 LBS | HEART RATE: 79 BPM | HEIGHT: 65 IN | BODY MASS INDEX: 40.65 KG/M2

## 2024-09-16 DIAGNOSIS — J32.9 CHRONIC SINUSITIS, UNSPECIFIED LOCATION: ICD-10-CM

## 2024-09-16 DIAGNOSIS — R04.0 EPISTAXIS: Primary | ICD-10-CM

## 2024-09-16 PROCEDURE — 99203 OFFICE O/P NEW LOW 30 MIN: CPT | Performed by: OTOLARYNGOLOGY

## 2024-09-16 PROCEDURE — 3078F DIAST BP <80 MM HG: CPT | Performed by: OTOLARYNGOLOGY

## 2024-09-16 PROCEDURE — 3075F SYST BP GE 130 - 139MM HG: CPT | Performed by: OTOLARYNGOLOGY

## 2024-09-16 RX ORDER — AZITHROMYCIN 1 G/1
1 POWDER, FOR SUSPENSION ORAL ONCE
COMMUNITY

## 2024-09-16 RX ORDER — OXYMETAZOLINE HYDROCHLORIDE 0.05 G/100ML
2 SPRAY NASAL 2 TIMES DAILY
COMMUNITY

## 2024-09-18 ENCOUNTER — OFFICE VISIT (OUTPATIENT)
Dept: ENT CLINIC | Age: 64
End: 2024-09-18

## 2024-09-18 ENCOUNTER — HOSPITAL ENCOUNTER (OUTPATIENT)
Dept: CT IMAGING | Age: 64
Discharge: HOME | End: 2024-09-18
Attending: OTOLARYNGOLOGY

## 2024-09-18 VITALS
BODY MASS INDEX: 40.65 KG/M2 | SYSTOLIC BLOOD PRESSURE: 137 MMHG | WEIGHT: 244 LBS | OXYGEN SATURATION: 96 % | DIASTOLIC BLOOD PRESSURE: 72 MMHG | HEART RATE: 75 BPM | HEIGHT: 65 IN

## 2024-09-18 DIAGNOSIS — R04.0 EPISTAXIS: ICD-10-CM

## 2024-09-18 DIAGNOSIS — J34.89 NASAL LESION: ICD-10-CM

## 2024-09-18 DIAGNOSIS — J32.9 CHRONIC SINUSITIS, UNSPECIFIED LOCATION: ICD-10-CM

## 2024-09-18 DIAGNOSIS — R04.0 EPISTAXIS: Primary | ICD-10-CM

## 2024-09-18 PROCEDURE — 99213 OFFICE O/P EST LOW 20 MIN: CPT | Performed by: OTOLARYNGOLOGY

## 2024-09-18 PROCEDURE — 3078F DIAST BP <80 MM HG: CPT | Performed by: OTOLARYNGOLOGY

## 2024-09-18 PROCEDURE — 3075F SYST BP GE 130 - 139MM HG: CPT | Performed by: OTOLARYNGOLOGY

## 2024-09-18 PROCEDURE — 70486 CT MAXILLOFACIAL W/O DYE: CPT

## 2024-09-18 RX ORDER — MUPIROCIN 20 MG/G
OINTMENT TOPICAL
Qty: 1 EACH | Refills: 0 | Status: SHIPPED | OUTPATIENT
Start: 2024-09-18 | End: 2024-09-25

## 2024-09-23 ENCOUNTER — HOSPITAL ENCOUNTER (OUTPATIENT)
Age: 64
Discharge: HOME | End: 2024-09-23

## 2024-09-23 ENCOUNTER — HOSPITAL ENCOUNTER (OUTPATIENT)
Dept: GENERAL RADIOLOGY | Age: 64
Discharge: HOME | End: 2024-09-23

## 2024-09-23 DIAGNOSIS — M19.90 CHRONIC ARTHRITIS: ICD-10-CM

## 2024-09-23 PROCEDURE — 73610 X-RAY EXAM OF ANKLE: CPT

## 2024-09-23 PROCEDURE — 73630 X-RAY EXAM OF FOOT: CPT

## 2024-10-10 ENCOUNTER — OFFICE VISIT (OUTPATIENT)
Dept: ENT CLINIC | Age: 64
End: 2024-10-10

## 2024-10-10 VITALS
DIASTOLIC BLOOD PRESSURE: 80 MMHG | SYSTOLIC BLOOD PRESSURE: 174 MMHG | OXYGEN SATURATION: 98 % | WEIGHT: 247 LBS | HEIGHT: 65 IN | BODY MASS INDEX: 41.15 KG/M2 | HEART RATE: 81 BPM

## 2024-10-10 DIAGNOSIS — R04.0 EPISTAXIS: Primary | ICD-10-CM

## 2024-10-10 PROCEDURE — 99213 OFFICE O/P EST LOW 20 MIN: CPT | Performed by: OTOLARYNGOLOGY

## 2024-10-10 PROCEDURE — 3079F DIAST BP 80-89 MM HG: CPT | Performed by: OTOLARYNGOLOGY

## 2024-10-10 PROCEDURE — 3077F SYST BP >= 140 MM HG: CPT | Performed by: OTOLARYNGOLOGY

## 2024-10-10 NOTE — PROGRESS NOTES
[facility]  DIVISION OF OTOLARYNGOLOGY- HEAD & NECK SURGERY  Follow up      Patient Name: Casandra Torres  Medical Record Number:  [id number]  Primary Care Physician:  Derian Eisenberg MD  Date of Consultation: 10/10/2024    Chief Complaint: Nosebleed        Interval History    Patient is following up for her nosebleeds.  She has not had any bleeding.  She says that she still having some soreness on the right side.          REVIEW OF SYSTEMS  As above    PHYSICAL EXAM  GENERAL: No Acute Distress, Alert and Oriented, no Hoarseness, strong voice  EYES: EOMI, Anti-icteric  HENT:   Head: Normocephalic and atraumatic.   Face:  Symmetric, facial nerve intact, no sinus tenderness  Right Ear: Normal external ear  Left Ear: Normal external ear  Mouth/Oral Cavity:  normal lips, Uvula is midline, no mucosal lesions, no trismus  Oropharynx/Larynx:  normal oropharynx  Nose:Normal external nasal appearance.  Anterior rhinoscopy shows that the irritation of the inferior turbinate has cleared up  NECK: Normal range of motion, no thyromegaly, trachea is midline, no lymphadenopathy, no neck masses, no crepitus                ASSESSMENT/PLAN  1. Epistaxis  She has not had any bleeding now in a while.  We done an extensive workup.  I think she has pretty bad anxiety about this, but I do not see anything on concerned about.  I do not recommend any other intervention unless she develops another nosebleed.             I have performed a head and neck physical exam personally or was physically present during the key or critical portions of the service.    This note was generated completely or in part utilizing Dragon dictation speech recognition software.  Occasionally, words are mistranscribed and despite editing, the text may contain inaccuracies due to incorrect word recognition.  If further clarification is needed please contact the office at [phone].

## 2024-10-26 ENCOUNTER — APPOINTMENT (OUTPATIENT)
Dept: CT IMAGING | Age: 64
End: 2024-10-26

## 2024-10-26 ENCOUNTER — HOSPITAL ENCOUNTER (EMERGENCY)
Age: 64
Discharge: HOME | End: 2024-10-26

## 2024-10-26 VITALS
RESPIRATION RATE: 14 BRPM | BODY MASS INDEX: 41.73 KG/M2 | HEIGHT: 65 IN | WEIGHT: 250.44 LBS | TEMPERATURE: 98.3 F | OXYGEN SATURATION: 97 % | HEART RATE: 72 BPM | SYSTOLIC BLOOD PRESSURE: 137 MMHG | DIASTOLIC BLOOD PRESSURE: 63 MMHG

## 2024-10-26 DIAGNOSIS — E83.42 HYPOMAGNESEMIA: ICD-10-CM

## 2024-10-26 DIAGNOSIS — E87.6 HYPOKALEMIA: ICD-10-CM

## 2024-10-26 DIAGNOSIS — R11.0 NAUSEA: Primary | ICD-10-CM

## 2024-10-26 LAB
ALBUMIN SERPL-MCNC: 3.8 G/DL (ref 3.4–5)
ALBUMIN/GLOB SERPL: 1.3 {RATIO} (ref 1.1–2.2)
ALP SERPL-CCNC: 154 U/L (ref 40–129)
ALT SERPL-CCNC: 16 U/L (ref 10–40)
ANION GAP SERPL CALCULATED.3IONS-SCNC: 11 MMOL/L (ref 3–16)
AST SERPL-CCNC: 17 U/L (ref 15–37)
BASOPHILS # BLD: 0 K/UL (ref 0–0.2)
BASOPHILS NFR BLD: 0.3 %
BILIRUB SERPL-MCNC: 0.5 MG/DL (ref 0–1)
BILIRUB UR QL STRIP.AUTO: NEGATIVE
BUN SERPL-MCNC: 18 MG/DL (ref 7–20)
CALCIUM SERPL-MCNC: 9.3 MG/DL (ref 8.3–10.6)
CHLORIDE SERPL-SCNC: 103 MMOL/L (ref 99–110)
CLARITY UR: CLEAR
CO2 SERPL-SCNC: 25 MMOL/L (ref 21–32)
COLOR UR: YELLOW
CREAT SERPL-MCNC: 0.9 MG/DL (ref 0.6–1.2)
DEPRECATED RDW RBC AUTO: 14.5 % (ref 12.4–15.4)
EOSINOPHIL # BLD: 0 K/UL (ref 0–0.6)
EOSINOPHIL NFR BLD: 0.3 %
FLUAV RNA UPPER RESP QL NAA+PROBE: NEGATIVE
FLUBV AG NPH QL: NEGATIVE
GFR SERPLBLD CREATININE-BSD FMLA CKD-EPI: 72 ML/MIN/{1.73_M2}
GLUCOSE SERPL-MCNC: 153 MG/DL (ref 70–99)
GLUCOSE UR STRIP.AUTO-MCNC: NEGATIVE MG/DL
HCT VFR BLD AUTO: 40.3 % (ref 36–48)
HGB BLD-MCNC: 13.7 G/DL (ref 12–16)
HGB UR QL STRIP.AUTO: NEGATIVE
KETONES UR STRIP.AUTO-MCNC: NEGATIVE MG/DL
LEUKOCYTE ESTERASE UR QL STRIP.AUTO: NEGATIVE
LIPASE SERPL-CCNC: 37 U/L (ref 13–60)
LYMPHOCYTES # BLD: 1.1 K/UL (ref 1–5.1)
LYMPHOCYTES NFR BLD: 8 %
MAGNESIUM SERPL-MCNC: 1.6 MG/DL (ref 1.8–2.4)
MCH RBC QN AUTO: 27.8 PG (ref 26–34)
MCHC RBC AUTO-ENTMCNC: 34 G/DL (ref 31–36)
MCV RBC AUTO: 81.8 FL (ref 80–100)
MONOCYTES # BLD: 0.6 K/UL (ref 0–1.3)
MONOCYTES NFR BLD: 4.1 %
NEUTROPHILS # BLD: 11.7 K/UL (ref 1.7–7.7)
NEUTROPHILS NFR BLD: 87.3 %
NITRITE UR QL STRIP.AUTO: NEGATIVE
PH UR STRIP.AUTO: 7 [PH] (ref 5–8)
PLATELET # BLD AUTO: 260 K/UL (ref 135–450)
PMV BLD AUTO: 8.9 FL (ref 5–10.5)
POTASSIUM SERPL-SCNC: 3.3 MMOL/L (ref 3.5–5.1)
PROT SERPL-MCNC: 6.7 G/DL (ref 6.4–8.2)
PROT UR STRIP.AUTO-MCNC: NEGATIVE MG/DL
RBC # BLD AUTO: 4.92 M/UL (ref 4–5.2)
SARS-COV-2 RDRP RESP QL NAA+PROBE: NOT DETECTED
SODIUM SERPL-SCNC: 139 MMOL/L (ref 136–145)
SP GR UR STRIP.AUTO: 1.01 (ref 1–1.03)
UA COMPLETE W REFLEX CULTURE PNL UR: NORMAL
UA DIPSTICK W REFLEX MICRO PNL UR: NORMAL
URN SPEC COLLECT METH UR: NORMAL
UROBILINOGEN UR STRIP-ACNC: 0.2 E.U./DL
WBC # BLD AUTO: 13.5 K/UL (ref 4–11)

## 2024-10-26 PROCEDURE — 99285 EMERGENCY DEPT VISIT HI MDM: CPT

## 2024-10-26 PROCEDURE — 96365 THER/PROPH/DIAG IV INF INIT: CPT

## 2024-10-26 PROCEDURE — 81003 URINALYSIS AUTO W/O SCOPE: CPT

## 2024-10-26 PROCEDURE — 85025 COMPLETE CBC W/AUTO DIFF WBC: CPT

## 2024-10-26 PROCEDURE — 96366 THER/PROPH/DIAG IV INF ADDON: CPT

## 2024-10-26 PROCEDURE — 87635 SARS-COV-2 COVID-19 AMP PRB: CPT

## 2024-10-26 PROCEDURE — 80053 COMPREHEN METABOLIC PANEL: CPT

## 2024-10-26 PROCEDURE — 74177 CT ABD & PELVIS W/CONTRAST: CPT

## 2024-10-26 PROCEDURE — 83735 ASSAY OF MAGNESIUM: CPT

## 2024-10-26 PROCEDURE — 6370000000 HC RX 637 (ALT 250 FOR IP): Performed by: PHYSICIAN ASSISTANT

## 2024-10-26 PROCEDURE — 83690 ASSAY OF LIPASE: CPT

## 2024-10-26 PROCEDURE — 96375 TX/PRO/DX INJ NEW DRUG ADDON: CPT

## 2024-10-26 PROCEDURE — 6360000004 HC RX CONTRAST MEDICATION: Performed by: PHYSICIAN ASSISTANT

## 2024-10-26 PROCEDURE — 6360000002 HC RX W HCPCS: Performed by: PHYSICIAN ASSISTANT

## 2024-10-26 PROCEDURE — 87502 INFLUENZA DNA AMP PROBE: CPT

## 2024-10-26 PROCEDURE — 87804 INFLUENZA ASSAY W/OPTIC: CPT

## 2024-10-26 RX ORDER — ONDANSETRON 4 MG/1
4 TABLET, ORALLY DISINTEGRATING ORAL 3 TIMES DAILY PRN
Qty: 21 TABLET | Refills: 0 | Status: SHIPPED | OUTPATIENT
Start: 2024-10-26

## 2024-10-26 RX ORDER — IOPAMIDOL 755 MG/ML
75 INJECTION, SOLUTION INTRAVASCULAR
Status: COMPLETED | OUTPATIENT
Start: 2024-10-26 | End: 2024-10-26

## 2024-10-26 RX ORDER — ONDANSETRON 4 MG/1
4 TABLET, ORALLY DISINTEGRATING ORAL ONCE
Status: COMPLETED | OUTPATIENT
Start: 2024-10-26 | End: 2024-10-26

## 2024-10-26 RX ORDER — ONDANSETRON 2 MG/ML
4 INJECTION INTRAMUSCULAR; INTRAVENOUS PRN
Status: COMPLETED | OUTPATIENT
Start: 2024-10-26 | End: 2024-10-26

## 2024-10-26 RX ORDER — MAGNESIUM SULFATE IN WATER 40 MG/ML
2000 INJECTION, SOLUTION INTRAVENOUS ONCE
Status: COMPLETED | OUTPATIENT
Start: 2024-10-26 | End: 2024-10-26

## 2024-10-26 RX ADMIN — POTASSIUM BICARBONATE 40 MEQ: 782 TABLET, EFFERVESCENT ORAL at 09:17

## 2024-10-26 RX ADMIN — ONDANSETRON 4 MG: 4 TABLET, ORALLY DISINTEGRATING ORAL at 12:27

## 2024-10-26 RX ADMIN — IOPAMIDOL 75 ML: 755 INJECTION, SOLUTION INTRAVENOUS at 08:57

## 2024-10-26 RX ADMIN — MAGNESIUM SULFATE HEPTAHYDRATE 2000 MG: 40 INJECTION, SOLUTION INTRAVENOUS at 09:18

## 2024-10-26 RX ADMIN — ONDANSETRON 4 MG: 2 INJECTION INTRAMUSCULAR; INTRAVENOUS at 08:03

## 2024-10-26 ASSESSMENT — LIFESTYLE VARIABLES
HOW OFTEN DO YOU HAVE A DRINK CONTAINING ALCOHOL: NEVER
HOW MANY STANDARD DRINKS CONTAINING ALCOHOL DO YOU HAVE ON A TYPICAL DAY: PATIENT DOES NOT DRINK

## 2024-10-26 ASSESSMENT — PAIN DESCRIPTION - LOCATION: LOCATION: ABDOMEN

## 2024-10-26 ASSESSMENT — PAIN - FUNCTIONAL ASSESSMENT
PAIN_FUNCTIONAL_ASSESSMENT: 0-10
PAIN_FUNCTIONAL_ASSESSMENT: NONE - DENIES PAIN

## 2024-10-26 ASSESSMENT — PAIN SCALES - GENERAL: PAINLEVEL_OUTOF10: 0

## 2024-10-26 NOTE — ED PROVIDER NOTES
[metoclopramide], Adhesive tape, Cefuroxime axetil, and Codeine    FAMILYHISTORY       Family History   Problem Relation Age of Onset   • Heart Disease Mother    • Diabetes Mother    • Cancer Father         lung   • Stroke Father    • COPD Father    • Diabetes Brother         SOCIAL HISTORY       Social History     Tobacco Use   • Smoking status: Never   • Smokeless tobacco: Never   Substance Use Topics   • Alcohol use: No   • Drug use: No       SCREENINGS                                     PHYSICAL EXAM  1 or more Elements     ED Triage Vitals   BP Systolic BP Percentile Diastolic BP Percentile Temp Temp src Pulse Resp SpO2   -- -- -- -- -- -- -- --      Height Weight         -- --             Physical Exam  Vitals and nursing note reviewed.   Constitutional:       General: She is not in acute distress.     Appearance: She is well-developed. She is not ill-appearing, toxic-appearing or diaphoretic.   HENT:      Head: Normocephalic and atraumatic.      Right Ear: External ear normal.      Left Ear: External ear normal.      Nose: Nose normal.      Mouth/Throat:      Mouth: Mucous membranes are moist.      Pharynx: Oropharynx is clear.   Eyes:      General:         Right eye: No discharge.         Left eye: No discharge.      Extraocular Movements: Extraocular movements intact.      Conjunctiva/sclera: Conjunctivae normal.      Pupils: Pupils are equal, round, and reactive to light.   Cardiovascular:      Rate and Rhythm: Normal rate and regular rhythm.      Heart sounds: Normal heart sounds. No murmur heard.     No friction rub. No gallop.   Pulmonary:      Effort: Pulmonary effort is normal. No respiratory distress.      Breath sounds: Normal breath sounds. No wheezing or rales.   Abdominal:      General: Bowel sounds are normal. There is no distension.      Palpations: Abdomen is soft.      Tenderness: There is abdominal tenderness in the periumbilical area. There is no guarding or rebound.   Musculoskeletal:      Record.    FINAL IMPRESSION      1. Nausea    2. Hypokalemia    3. Hypomagnesemia          DISPOSITION/PLAN     DISPOSITION Decision To Discharge 10/26/2024 11:44:37 AM           PATIENT REFERRED TO:  Derian Eisenberg MD  [address]  [facility] [address]  [phone]    Schedule an appointment as soon as possible for a visit       [facility] Emergency Department  [address] [id number] [phone]  Go to   If symptoms worsen      DISCHARGE MEDICATIONS:  Discharge Medication List as of 10/26/2024 12:25 PM        START taking these medications    Details   ondansetron (ZOFRAN-ODT) 4 MG disintegrating tablet Take 1 tablet by mouth 3 times daily as needed for Nausea or Vomiting, Disp-21 tablet, R-0Normal             DISCONTINUED MEDICATIONS:  Discharge Medication List as of 10/26/2024 12:25 PM                 (Please note that portions of this note were completed with a voice recognition program.  Efforts were made to edit the dictations but occasionally words are mis-transcribed.)    BERTHA Nolasco (electronically signed)            Zuleika Fajardo PA  10/29/24 [id number]

## 2024-10-26 NOTE — ED NOTES
Pt's family to nurses' station sts pt needs to go to restroom; this nurse to bedside to assist. Pt ambulated with steady gait to restroom and back to room without difficulty.  Pt tolerated drinking all \"big gulp\" without emesis.

## 2024-10-26 NOTE — ED TRIAGE NOTES
KRYSTEN Johnson twp from home as pt awaken this morning around  3a with abd pain, nausea and dry \"heaving\",  reports began taking Vit D yesterday as new medication; medics sts pt hypertensive upon arrival with 200/140 administered 4mg IV zofran via 20 IV placed, pt alert and oriented x 4, /63, sts \"mouth dry\". Provider at bedside

## 2024-10-30 ENCOUNTER — HOSPITAL ENCOUNTER (OUTPATIENT)
Dept: GENERAL RADIOLOGY | Age: 64
Discharge: HOME | End: 2024-10-30

## 2024-10-30 ENCOUNTER — HOSPITAL ENCOUNTER (OUTPATIENT)
Age: 64
Discharge: HOME | End: 2024-10-30

## 2024-10-30 DIAGNOSIS — M19.90 ARTHRITIS: ICD-10-CM

## 2024-10-30 DIAGNOSIS — M19.90 CHRONIC ARTHRITIS: ICD-10-CM

## 2024-10-30 PROCEDURE — 73130 X-RAY EXAM OF HAND: CPT
